# Patient Record
Sex: MALE | Race: WHITE | Employment: OTHER | URBAN - METROPOLITAN AREA
[De-identification: names, ages, dates, MRNs, and addresses within clinical notes are randomized per-mention and may not be internally consistent; named-entity substitution may affect disease eponyms.]

---

## 2017-01-06 ENCOUNTER — OFFICE VISIT (OUTPATIENT)
Dept: UROLOGY | Age: 67
End: 2017-01-06

## 2017-01-06 VITALS
DIASTOLIC BLOOD PRESSURE: 84 MMHG | BODY MASS INDEX: 38.43 KG/M2 | HEIGHT: 73 IN | SYSTOLIC BLOOD PRESSURE: 130 MMHG | WEIGHT: 290 LBS | HEART RATE: 56 BPM

## 2017-01-06 DIAGNOSIS — Z85.46 H/O PROSTATE CANCER: Primary | ICD-10-CM

## 2017-01-06 PROCEDURE — 99213 OFFICE O/P EST LOW 20 MIN: CPT | Performed by: UROLOGY

## 2017-01-06 RX ORDER — UBIDECARENONE 75 MG
50 CAPSULE ORAL DAILY
COMMUNITY

## 2017-01-06 ASSESSMENT — ENCOUNTER SYMPTOMS: SHORTNESS OF BREATH: 0

## 2017-12-28 DIAGNOSIS — Z85.46 H/O PROSTATE CANCER: ICD-10-CM

## 2017-12-28 LAB — PROSTATE SPECIFIC ANTIGEN: 0.03 NG/ML (ref 0–5.4)

## 2018-01-05 ENCOUNTER — OFFICE VISIT (OUTPATIENT)
Dept: UROLOGY | Age: 68
End: 2018-01-05

## 2018-01-05 VITALS
WEIGHT: 289 LBS | HEART RATE: 62 BPM | SYSTOLIC BLOOD PRESSURE: 136 MMHG | BODY MASS INDEX: 38.3 KG/M2 | HEIGHT: 73 IN | DIASTOLIC BLOOD PRESSURE: 80 MMHG

## 2018-01-05 DIAGNOSIS — Z85.46 H/O PROSTATE CANCER: Primary | ICD-10-CM

## 2018-01-05 PROCEDURE — 1123F ACP DISCUSS/DSCN MKR DOCD: CPT | Performed by: UROLOGY

## 2018-01-05 PROCEDURE — G8484 FLU IMMUNIZE NO ADMIN: HCPCS | Performed by: UROLOGY

## 2018-01-05 PROCEDURE — 1036F TOBACCO NON-USER: CPT | Performed by: UROLOGY

## 2018-01-05 PROCEDURE — 99212 OFFICE O/P EST SF 10 MIN: CPT | Performed by: UROLOGY

## 2018-01-05 PROCEDURE — 4040F PNEUMOC VAC/ADMIN/RCVD: CPT | Performed by: UROLOGY

## 2018-01-05 PROCEDURE — 3017F COLORECTAL CA SCREEN DOC REV: CPT | Performed by: UROLOGY

## 2018-01-05 PROCEDURE — G8427 DOCREV CUR MEDS BY ELIG CLIN: HCPCS | Performed by: UROLOGY

## 2018-01-05 PROCEDURE — G8417 CALC BMI ABV UP PARAM F/U: HCPCS | Performed by: UROLOGY

## 2018-01-05 ASSESSMENT — ENCOUNTER SYMPTOMS
SHORTNESS OF BREATH: 0
ABDOMINAL DISTENTION: 0
ABDOMINAL PAIN: 0

## 2018-01-05 NOTE — PROGRESS NOTES
Subjective:      Patient ID: Samuel Mack is a 75 y.o. male. HPI  This is a 74 yo white male with h/o HTN, CAD/Stents and [place] 7 prostate cancer s/p RALP (rt nerve sparing/angeles LND) on 2/14/12. Since last seen on 1/6/17, he has no frequency or urgency and feels the bladder empties. He denies RAYMUNDO. He has no hematuria or dysuria. He has no abd or flank pain and no wt loss or abnl  bone pains. He has no interval medical or surgical problems.  I reviewed his interval PSA today.      PATH: LN: neg, [place] 4+3 = 7 angeles with PN invasion, margins neg, capsule neg, sv neg       Past Medical History:   Diagnosis Date    Erectile dysfunction     Hyperlipidemia     Hypertension     Prostate cancer ([facility] [address] 2011    Prostatectomy Dr. Rocio Chew Type II or unspecified type diabetes mellitus without mention of complication, not stated as uncontrolled     Unspecified sleep apnea     CPAP Machine     Past Surgical History:   Procedure Laterality Date    CARDIAC CATHETERIZATION  2016    COLONOSCOPY  2009 2016    Dr. James Prieto  2001    3 stents place post Heart Attack    EYE SURGERY Bilateral 9/2015 10/2015    cataract   Kevin Alert  2011    Dr. Jessica Espinal. [facility] Given History     Social History    Marital status:      Spouse name: N/A    Number of children: N/A    Years of education: N/A     Social History Main Topics    Smoking status: Former Smoker     Quit date: 2/1/1998    Smokeless tobacco: Never Used    Alcohol use 1.2 oz/week     1 Cans of beer, 1 Glasses of wine per week      Comment: social    Drug use: No    Sexual activity: Not Asked     Other Topics Concern    None     Social History Narrative    None     Family History   Problem Relation Age of Onset    Stroke Mother      Current Outpatient Prescriptions   Medication Sig Dispense Refill    vitamin B-12 (CYANOCOBALAMIN) 100 MCG tablet Take 50 mcg by mouth daily      aspirin 81 MG tablet Take

## 2018-09-18 ENCOUNTER — APPOINTMENT (OUTPATIENT)
Dept: GENERAL RADIOLOGY | Age: 68
End: 2018-09-18
Payer: MEDICARE

## 2018-09-18 ENCOUNTER — HOSPITAL ENCOUNTER (EMERGENCY)
Age: 68
Discharge: HOME | End: 2018-09-18
Payer: MEDICARE

## 2018-09-18 VITALS
OXYGEN SATURATION: 95 % | HEART RATE: 66 BPM | WEIGHT: 280 LBS | HEIGHT: 73 IN | TEMPERATURE: 98.5 F | BODY MASS INDEX: 37.11 KG/M2 | SYSTOLIC BLOOD PRESSURE: 137 MMHG | DIASTOLIC BLOOD PRESSURE: 78 MMHG | RESPIRATION RATE: 18 BRPM

## 2018-09-18 DIAGNOSIS — S52.001A CLOSED FRACTURE OF PROXIMAL END OF RIGHT ULNA, UNSPECIFIED FRACTURE MORPHOLOGY, INITIAL ENCOUNTER: Primary | ICD-10-CM

## 2018-09-18 PROCEDURE — 73080 X-RAY EXAM OF ELBOW: CPT

## 2018-09-18 PROCEDURE — 99283 EMERGENCY DEPT VISIT LOW MDM: CPT

## 2018-09-18 PROCEDURE — 29105 APPLICATION LONG ARM SPLINT: CPT

## 2018-09-18 RX ORDER — OXYCODONE HYDROCHLORIDE AND ACETAMINOPHEN 5; 325 MG/1; MG/1
1 TABLET ORAL EVERY 4 HOURS PRN
Qty: 10 TABLET | Refills: 0 | Status: SHIPPED | OUTPATIENT
Start: 2018-09-18 | End: 2018-09-25

## 2018-09-18 RX ORDER — NAPROXEN 500 MG/1
500 TABLET ORAL 2 TIMES DAILY
Qty: 20 TABLET | Refills: 0 | Status: ON HOLD | OUTPATIENT
Start: 2018-09-18 | End: 2018-11-24 | Stop reason: HOSPADM

## 2018-09-18 ASSESSMENT — ENCOUNTER SYMPTOMS
ABDOMINAL PAIN: 0
COUGH: 0
BACK PAIN: 0
SHORTNESS OF BREATH: 0

## 2018-09-18 ASSESSMENT — PAIN DESCRIPTION - LOCATION: LOCATION: ELBOW

## 2018-09-18 ASSESSMENT — PAIN SCALES - GENERAL: PAINLEVEL_OUTOF10: 6

## 2018-09-18 ASSESSMENT — PAIN DESCRIPTION - PAIN TYPE: TYPE: ACUTE PAIN

## 2018-09-18 ASSESSMENT — PAIN DESCRIPTION - ORIENTATION: ORIENTATION: RIGHT
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
## 2018-11-20 ENCOUNTER — APPOINTMENT (OUTPATIENT)
Dept: GENERAL RADIOLOGY | Age: 68
DRG: 282 | End: 2018-11-20
Payer: MEDICARE

## 2018-11-20 ENCOUNTER — HOSPITAL ENCOUNTER (INPATIENT)
Age: 68
LOS: 4 days | Discharge: HOME | DRG: 282 | End: 2018-11-24
Attending: EMERGENCY MEDICINE | Admitting: INTERNAL MEDICINE
Payer: MEDICARE

## 2018-11-20 ENCOUNTER — APPOINTMENT (OUTPATIENT)
Dept: CT IMAGING | Age: 68
DRG: 282 | End: 2018-11-20
Payer: MEDICARE

## 2018-11-20 DIAGNOSIS — I48.91 ATRIAL FIBRILLATION WITH RVR (HCC): Primary | ICD-10-CM

## 2018-11-20 LAB
ALBUMIN SERPL-MCNC: 4.5 G/DL (ref 3.9–4.9)
ALP BLD-CCNC: 86 U/L (ref 35–104)
ALT SERPL-CCNC: 34 U/L (ref 0–41)
ANION GAP SERPL CALCULATED.3IONS-SCNC: 12 MEQ/L (ref 7–13)
APTT: 26.6 SEC (ref 21.6–35.4)
AST SERPL-CCNC: 24 U/L (ref 0–40)
BASOPHILS ABSOLUTE: 0 K/UL (ref 0–0.2)
BASOPHILS RELATIVE PERCENT: 0.4 %
BILIRUB SERPL-MCNC: 0.5 MG/DL (ref 0–1.2)
BUN BLDV-MCNC: 20 MG/DL (ref 8–23)
CALCIUM SERPL-MCNC: 10.2 MG/DL (ref 8.6–10.2)
CHLORIDE BLD-SCNC: 101 MEQ/L (ref 98–107)
CO2: 28 MEQ/L (ref 22–29)
CREAT SERPL-MCNC: 1.09 MG/DL (ref 0.7–1.2)
D DIMER: 0.27 MG/L FEU (ref 0–0.5)
EOSINOPHILS ABSOLUTE: 0.1 K/UL (ref 0–0.7)
EOSINOPHILS RELATIVE PERCENT: 1.5 %
GFR AFRICAN AMERICAN: >60
GFR NON-AFRICAN AMERICAN: >60
GLOBULIN: 2.6 G/DL (ref 2.3–3.5)
GLUCOSE BLD-MCNC: 118 MG/DL (ref 60–115)
GLUCOSE BLD-MCNC: 129 MG/DL (ref 60–115)
GLUCOSE BLD-MCNC: 175 MG/DL (ref 74–109)
HCT VFR BLD CALC: 46 % (ref 42–52)
HEMOGLOBIN: 16 G/DL (ref 14–18)
INR BLD: 1
LYMPHOCYTES ABSOLUTE: 1.1 K/UL (ref 1–4.8)
LYMPHOCYTES RELATIVE PERCENT: 14.4 %
MAGNESIUM: 2 MG/DL (ref 1.7–2.3)
MCH RBC QN AUTO: 33.2 PG (ref 27–31.3)
MCHC RBC AUTO-ENTMCNC: 34.8 % (ref 33–37)
MCV RBC AUTO: 95.6 FL (ref 80–100)
MONOCYTES ABSOLUTE: 0.7 K/UL (ref 0.2–0.8)
MONOCYTES RELATIVE PERCENT: 8.7 %
NEUTROPHILS ABSOLUTE: 5.8 K/UL (ref 1.4–6.5)
NEUTROPHILS RELATIVE PERCENT: 75 %
PDW BLD-RTO: 13.3 % (ref 11.5–14.5)
PERFORMED ON: ABNORMAL
PERFORMED ON: ABNORMAL
PLATELET # BLD: 160 K/UL (ref 130–400)
POTASSIUM SERPL-SCNC: 4.7 MEQ/L (ref 3.5–5.1)
PRO-BNP: 159 PG/ML
PROTHROMBIN TIME: 10.8 SEC (ref 9.6–12.3)
RBC # BLD: 4.81 M/UL (ref 4.7–6.1)
SODIUM BLD-SCNC: 141 MEQ/L (ref 132–144)
TOTAL PROTEIN: 7.1 G/DL (ref 6.4–8.1)
TROPONIN: 0.12 NG/ML (ref 0–0.01)
TROPONIN: 0.47 NG/ML (ref 0–0.01)
TROPONIN: <0.01 NG/ML (ref 0–0.01)
WBC # BLD: 7.7 K/UL (ref 4.8–10.8)

## 2018-11-20 PROCEDURE — 85610 PROTHROMBIN TIME: CPT

## 2018-11-20 PROCEDURE — 84484 ASSAY OF TROPONIN QUANT: CPT

## 2018-11-20 PROCEDURE — 96374 THER/PROPH/DIAG INJ IV PUSH: CPT

## 2018-11-20 PROCEDURE — 2580000003 HC RX 258: Performed by: EMERGENCY MEDICINE

## 2018-11-20 PROCEDURE — 93005 ELECTROCARDIOGRAM TRACING: CPT

## 2018-11-20 PROCEDURE — 93010 ELECTROCARDIOGRAM REPORT: CPT | Performed by: INTERNAL MEDICINE

## 2018-11-20 PROCEDURE — 80053 COMPREHEN METABOLIC PANEL: CPT

## 2018-11-20 PROCEDURE — 83880 ASSAY OF NATRIURETIC PEPTIDE: CPT

## 2018-11-20 PROCEDURE — 96375 TX/PRO/DX INJ NEW DRUG ADDON: CPT

## 2018-11-20 PROCEDURE — 6360000004 HC RX CONTRAST MEDICATION: Performed by: EMERGENCY MEDICINE

## 2018-11-20 PROCEDURE — 85379 FIBRIN DEGRADATION QUANT: CPT

## 2018-11-20 PROCEDURE — 85730 THROMBOPLASTIN TIME PARTIAL: CPT

## 2018-11-20 PROCEDURE — 71046 X-RAY EXAM CHEST 2 VIEWS: CPT

## 2018-11-20 PROCEDURE — 99285 EMERGENCY DEPT VISIT HI MDM: CPT

## 2018-11-20 PROCEDURE — 2060000000 HC ICU INTERMEDIATE R&B

## 2018-11-20 PROCEDURE — 85025 COMPLETE CBC W/AUTO DIFF WBC: CPT

## 2018-11-20 PROCEDURE — 36415 COLL VENOUS BLD VENIPUNCTURE: CPT

## 2018-11-20 PROCEDURE — 96376 TX/PRO/DX INJ SAME DRUG ADON: CPT

## 2018-11-20 PROCEDURE — 6370000000 HC RX 637 (ALT 250 FOR IP): Performed by: INTERNAL MEDICINE

## 2018-11-20 PROCEDURE — 83735 ASSAY OF MAGNESIUM: CPT

## 2018-11-20 PROCEDURE — 6360000002 HC RX W HCPCS: Performed by: EMERGENCY MEDICINE

## 2018-11-20 PROCEDURE — 2580000003 HC RX 258: Performed by: INTERNAL MEDICINE

## 2018-11-20 PROCEDURE — 74177 CT ABD & PELVIS W/CONTRAST: CPT

## 2018-11-20 PROCEDURE — 2500000003 HC RX 250 WO HCPCS: Performed by: EMERGENCY MEDICINE

## 2018-11-20 RX ORDER — MORPHINE SULFATE 4 MG/ML
4 INJECTION, SOLUTION INTRAMUSCULAR; INTRAVENOUS EVERY 4 HOURS PRN
Status: DISCONTINUED | OUTPATIENT
Start: 2018-11-20 | End: 2018-11-24 | Stop reason: HOSPADM

## 2018-11-20 RX ORDER — METOPROLOL SUCCINATE 50 MG/1
50 TABLET, EXTENDED RELEASE ORAL DAILY
Status: DISCONTINUED | OUTPATIENT
Start: 2018-11-20 | End: 2018-11-20

## 2018-11-20 RX ORDER — UBIDECARENONE 75 MG
50 CAPSULE ORAL DAILY
Status: DISCONTINUED | OUTPATIENT
Start: 2018-11-20 | End: 2018-11-24 | Stop reason: HOSPADM

## 2018-11-20 RX ORDER — METOPROLOL TARTRATE 5 MG/5ML
5 INJECTION INTRAVENOUS ONCE
Status: COMPLETED | OUTPATIENT
Start: 2018-11-20 | End: 2018-11-20

## 2018-11-20 RX ORDER — ACETAMINOPHEN 325 MG/1
650 TABLET ORAL EVERY 4 HOURS PRN
Status: DISCONTINUED | OUTPATIENT
Start: 2018-11-20 | End: 2018-11-24 | Stop reason: HOSPADM

## 2018-11-20 RX ORDER — MORPHINE SULFATE 4 MG/ML
4 INJECTION, SOLUTION INTRAMUSCULAR; INTRAVENOUS
Status: DISCONTINUED | OUTPATIENT
Start: 2018-11-20 | End: 2018-11-20

## 2018-11-20 RX ORDER — DIGOXIN 0.25 MG/ML
500 INJECTION INTRAMUSCULAR; INTRAVENOUS ONCE
Status: COMPLETED | OUTPATIENT
Start: 2018-11-20 | End: 2018-11-20

## 2018-11-20 RX ORDER — 0.9 % SODIUM CHLORIDE 0.9 %
1000 INTRAVENOUS SOLUTION INTRAVENOUS ONCE
Status: COMPLETED | OUTPATIENT
Start: 2018-11-20 | End: 2018-11-20

## 2018-11-20 RX ORDER — ONDANSETRON 2 MG/ML
4 INJECTION INTRAMUSCULAR; INTRAVENOUS EVERY 6 HOURS PRN
Status: DISCONTINUED | OUTPATIENT
Start: 2018-11-20 | End: 2018-11-22

## 2018-11-20 RX ORDER — RAMIPRIL 5 MG/1
10 CAPSULE ORAL DAILY
Status: DISCONTINUED | OUTPATIENT
Start: 2018-11-20 | End: 2018-11-24 | Stop reason: HOSPADM

## 2018-11-20 RX ORDER — NAPROXEN 500 MG/1
500 TABLET ORAL 2 TIMES DAILY
Status: DISCONTINUED | OUTPATIENT
Start: 2018-11-20 | End: 2018-11-20

## 2018-11-20 RX ORDER — NITROGLYCERIN 0.4 MG/1
0.4 TABLET SUBLINGUAL EVERY 5 MIN PRN
Status: DISCONTINUED | OUTPATIENT
Start: 2018-11-20 | End: 2018-11-24 | Stop reason: HOSPADM

## 2018-11-20 RX ORDER — METOPROLOL SUCCINATE 25 MG/1
25 TABLET, EXTENDED RELEASE ORAL 2 TIMES DAILY
Status: DISCONTINUED | OUTPATIENT
Start: 2018-11-20 | End: 2018-11-22

## 2018-11-20 RX ORDER — ASPIRIN 81 MG/1
81 TABLET ORAL DAILY
Status: DISCONTINUED | OUTPATIENT
Start: 2018-11-20 | End: 2018-11-24 | Stop reason: HOSPADM

## 2018-11-20 RX ORDER — DILTIAZEM HYDROCHLORIDE 5 MG/ML
10 INJECTION INTRAVENOUS ONCE
Status: COMPLETED | OUTPATIENT
Start: 2018-11-20 | End: 2018-11-20

## 2018-11-20 RX ORDER — SODIUM CHLORIDE 0.9 % (FLUSH) 0.9 %
10 SYRINGE (ML) INJECTION PRN
Status: DISCONTINUED | OUTPATIENT
Start: 2018-11-20 | End: 2018-11-24 | Stop reason: HOSPADM

## 2018-11-20 RX ORDER — SODIUM CHLORIDE 0.9 % (FLUSH) 0.9 %
10 SYRINGE (ML) INJECTION EVERY 12 HOURS SCHEDULED
Status: DISCONTINUED | OUTPATIENT
Start: 2018-11-20 | End: 2018-11-24

## 2018-11-20 RX ORDER — ONDANSETRON 2 MG/ML
4 INJECTION INTRAMUSCULAR; INTRAVENOUS ONCE
Status: COMPLETED | OUTPATIENT
Start: 2018-11-20 | End: 2018-11-20

## 2018-11-20 RX ORDER — ASPIRIN 81 MG/1
81 TABLET, CHEWABLE ORAL 2 TIMES DAILY
Status: DISCONTINUED | OUTPATIENT
Start: 2018-11-20 | End: 2018-11-20

## 2018-11-20 RX ORDER — LEVOTHYROXINE SODIUM 0.05 MG/1
50 TABLET ORAL DAILY
Status: DISCONTINUED | OUTPATIENT
Start: 2018-11-20 | End: 2018-11-24 | Stop reason: HOSPADM

## 2018-11-20 RX ORDER — ROSUVASTATIN CALCIUM 10 MG/1
10 TABLET, COATED ORAL DAILY
Status: DISCONTINUED | OUTPATIENT
Start: 2018-11-20 | End: 2018-11-24 | Stop reason: HOSPADM

## 2018-11-20 RX ADMIN — Medication 10 ML: at 20:13

## 2018-11-20 RX ADMIN — ENOXAPARIN SODIUM 120 MG: 120 INJECTION SUBCUTANEOUS at 13:20

## 2018-11-20 RX ADMIN — MORPHINE SULFATE 4 MG: 4 INJECTION, SOLUTION INTRAMUSCULAR; INTRAVENOUS at 11:48

## 2018-11-20 RX ADMIN — DILTIAZEM HYDROCHLORIDE 5 MG/HR: 5 INJECTION INTRAVENOUS at 13:28

## 2018-11-20 RX ADMIN — METOPROLOL TARTRATE 5 MG: 1 INJECTION, SOLUTION INTRAVENOUS at 12:07

## 2018-11-20 RX ADMIN — METOPROLOL TARTRATE 5 MG: 1 INJECTION, SOLUTION INTRAVENOUS at 11:58

## 2018-11-20 RX ADMIN — METOPROLOL SUCCINATE 25 MG: 25 TABLET, EXTENDED RELEASE ORAL at 20:08

## 2018-11-20 RX ADMIN — ONDANSETRON 4 MG: 2 INJECTION INTRAMUSCULAR; INTRAVENOUS at 11:49

## 2018-11-20 RX ADMIN — METOPROLOL TARTRATE 5 MG: 1 INJECTION, SOLUTION INTRAVENOUS at 12:26

## 2018-11-20 RX ADMIN — IOPAMIDOL 100 ML: 755 INJECTION, SOLUTION INTRAVENOUS at 14:39

## 2018-11-20 RX ADMIN — METOPROLOL TARTRATE 5 MG: 1 INJECTION, SOLUTION INTRAVENOUS at 11:48

## 2018-11-20 RX ADMIN — HYDROMORPHONE HYDROCHLORIDE 1 MG: 1 INJECTION, SOLUTION INTRAMUSCULAR; INTRAVENOUS; SUBCUTANEOUS at 12:26

## 2018-11-20 RX ADMIN — SODIUM CHLORIDE 1000 ML: 9 INJECTION, SOLUTION INTRAVENOUS at 11:48

## 2018-11-20 RX ADMIN — DIGOXIN 500 MCG: 250 INJECTION, SOLUTION INTRAMUSCULAR; INTRAVENOUS; PARENTERAL at 13:18

## 2018-11-20 RX ADMIN — ONDANSETRON 4 MG: 2 INJECTION INTRAMUSCULAR; INTRAVENOUS at 12:26

## 2018-11-20 RX ADMIN — DILTIAZEM HYDROCHLORIDE 10 MG: 5 INJECTION INTRAVENOUS at 13:25

## 2018-11-20 RX ADMIN — ROSUVASTATIN 10 MG: 10 TABLET, FILM COATED ORAL at 20:08

## 2018-11-20 ASSESSMENT — PAIN DESCRIPTION - LOCATION: LOCATION: CHEST

## 2018-11-20 ASSESSMENT — PAIN SCALES - GENERAL
PAINLEVEL_OUTOF10: 0
PAINLEVEL_OUTOF10: 8
PAINLEVEL_OUTOF10: 0

## 2018-11-20 ASSESSMENT — ENCOUNTER SYMPTOMS
SORE THROAT: 0
VOMITING: 0
NAUSEA: 0
DIARRHEA: 0
SHORTNESS OF BREATH: 0
COUGH: 0
ABDOMINAL PAIN: 0
BACK PAIN: 0

## 2018-11-20 ASSESSMENT — PAIN DESCRIPTION - PAIN TYPE: TYPE: ACUTE PAIN

## 2018-11-20 ASSESSMENT — PAIN DESCRIPTION - DESCRIPTORS: DESCRIPTORS: SHARP

## 2018-11-20 NOTE — ED PROVIDER NOTES
cardizem bolus, and gtt, subQ lovenox and admission for cards. Pt educated about the results and reassessed third time and feels well. Pt given IV digoxin, IV cardizem bolus and gtt and admitted to cardiology in serious condition. Pt understands plan. CRITICAL CARE TIME   Total CriticalCare time was 49 minutes, excluding separately reportable procedures. There was a high probability of clinically significant/life threatening deterioration in the patient's condition which required my urgent intervention. PROCEDURES:  Unlessotherwise noted below, none     Procedures      FINAL IMPRESSION      1.  Atrial fibrillation with RVR [facility])          DISPOSITION/PLAN   DISPOSITION Decision To Admit 11/20/2018 12:34:16 PM          Carey Langley MD (electronically signed)  Attending Emergency Physician          Carey Langley MD  11/20/18 1270

## 2018-11-20 NOTE — H&P
[address] [facility] Cardiology Admit Note        Date:    2018    Patient:   Jarrod Villegas    :   1950  CSN:   [id number]    Admitting Cardiologist: [address] [place] [address]     Primary Cardiologist: Geovani Lau M.D.    Reason for Admission:  Chest pain, new onset atrial fibrillation with rapid ventricular rate      Assessment:    1. Chest pain, new onset  2. Atrial fibrillation with rapid ventricular rate, new onset, chemically converted to sinus rhythm  3. Bradycardia, possible sick sinus syndrome ( Holter 2018 with arun BLOCK, PVCs). 4. Coronary artery disease, prior history of LAD and left circumflex stent angioplasties , repeat cardiac catheterization 2016 with patent LAD stent, left circumflex ostial occlusion, right coronary 50% stenosis, medical treated. 5. Prior inferolateral myocardial infarction  6. Ischemic cardiomyopathy, ejection fraction 45%  7. Hypertension  8. Hyperlipidemia  9. Diabetes  10. Erectile dysfunction  11. Morbid obesity  12. Prostate cancer post radical prostatectomy   13. Past smoker  14. Family history of coronary artery disease    Plan:    1. Cardiovascular supportive care  2. Telemetry monitoring  3. Serial cardiac enzymes and laboratory studies to rule out acute coronary syndrome, other. 4. D-dimer and TSH. 5. Echocardiogram  6. Lexiscan Myoview perfusion stress test  7. Possible cardiac catheterization as warranted ( +/- PCI ). 8. Wean off IV Cardizem drip as tolerated. 9. Lovenox for now with Eliquis upon discharge. 10. Continue metoprolol as tolerated  11. No further digoxin at this point. 12. Consider amiodarone or sotalol and/or if recurrence. 13. CPAP therapy. 14. Further recommendations to follow. 15. Possible discharge home tomorrow if stable.   16. See orders    HISTORY OF PRESENT ILLNESS:      The patient is a 70 y.o. male who follows with Dr. Livia Llanes for known history of coronary artery disease post stenting, cardiomyopathy, hypertension, Protime 10.8 9.6 - 12.3 sec    INR 1.0    EKG 12 Lead    Collection Time: 11/20/18  1:59 PM   Result Value Ref Range    Ventricular Rate 47 BPM    Atrial Rate 47 BPM    P-R Interval 184 ms    QRS Duration 88 ms    Q-T Interval 448 ms    QTc Calculation (Bazett) 396 ms    P Axis 41 degrees    R Axis -9 degrees    T Axis 109 degrees       ECG:    AFib with RVR,   Now repeat with SR, rate 47.     TELEMETRY:  Afib with RVR 190s, Now SR 50s        Joellen Castro MD   [facility] Cardiologist      3:41 PM

## 2018-11-21 ENCOUNTER — APPOINTMENT (OUTPATIENT)
Dept: NUCLEAR MEDICINE | Age: 68
DRG: 282 | End: 2018-11-21
Payer: MEDICARE

## 2018-11-21 ENCOUNTER — APPOINTMENT (OUTPATIENT)
Dept: CARDIAC CATH/INVASIVE PROCEDURES | Age: 68
DRG: 282 | End: 2018-11-21
Payer: MEDICARE

## 2018-11-21 ENCOUNTER — APPOINTMENT (OUTPATIENT)
Dept: GENERAL RADIOLOGY | Age: 68
DRG: 282 | End: 2018-11-21
Payer: MEDICARE

## 2018-11-21 LAB
ALBUMIN SERPL-MCNC: 3.8 G/DL (ref 3.9–4.9)
ALP BLD-CCNC: 72 U/L (ref 35–104)
ALT SERPL-CCNC: 31 U/L (ref 0–41)
ANION GAP SERPL CALCULATED.3IONS-SCNC: 10 MEQ/L (ref 7–13)
AST SERPL-CCNC: 36 U/L (ref 0–40)
BILIRUB SERPL-MCNC: 0.5 MG/DL (ref 0–1.2)
BUN BLDV-MCNC: 16 MG/DL (ref 8–23)
C-REACTIVE PROTEIN, HIGH SENSITIVITY: 1 MG/L (ref 0–5)
CALCIUM SERPL-MCNC: 9.6 MG/DL (ref 8.6–10.2)
CHLORIDE BLD-SCNC: 103 MEQ/L (ref 98–107)
CO2: 25 MEQ/L (ref 22–29)
CREAT SERPL-MCNC: 0.94 MG/DL (ref 0.7–1.2)
DIGOXIN LEVEL: 0.5 NG/ML (ref 0.8–2)
GFR AFRICAN AMERICAN: >60
GFR NON-AFRICAN AMERICAN: >60
GLOBULIN: 2.2 G/DL (ref 2.3–3.5)
GLUCOSE BLD-MCNC: 109 MG/DL (ref 60–115)
GLUCOSE BLD-MCNC: 119 MG/DL (ref 74–109)
GLUCOSE BLD-MCNC: 187 MG/DL (ref 60–115)
HCT VFR BLD CALC: 42.2 % (ref 42–52)
HEMOGLOBIN: 14.4 G/DL (ref 14–18)
LV EF: 51 %
LVEF MODALITY: NORMAL
MAGNESIUM: 2.1 MG/DL (ref 1.7–2.3)
MCH RBC QN AUTO: 33.1 PG (ref 27–31.3)
MCHC RBC AUTO-ENTMCNC: 34.2 % (ref 33–37)
MCV RBC AUTO: 96.7 FL (ref 80–100)
PDW BLD-RTO: 13.2 % (ref 11.5–14.5)
PERFORMED ON: ABNORMAL
PERFORMED ON: NORMAL
PLATELET # BLD: 145 K/UL (ref 130–400)
POTASSIUM SERPL-SCNC: 4.9 MEQ/L (ref 3.5–5.1)
RBC # BLD: 4.36 M/UL (ref 4.7–6.1)
SEDIMENTATION RATE, ERYTHROCYTE: 1 MM (ref 0–20)
SODIUM BLD-SCNC: 138 MEQ/L (ref 132–144)
TOTAL PROTEIN: 6 G/DL (ref 6.4–8.1)
TROPONIN: 0.29 NG/ML (ref 0–0.01)
TROPONIN: 0.33 NG/ML (ref 0–0.01)
TROPONIN: 0.57 NG/ML (ref 0–0.01)
TROPONIN: 0.6 NG/ML (ref 0–0.01)
TSH SERPL DL<=0.05 MIU/L-ACNC: 2.2 UIU/ML (ref 0.27–4.2)
WBC # BLD: 5.8 K/UL (ref 4.8–10.8)

## 2018-11-21 PROCEDURE — 80053 COMPREHEN METABOLIC PANEL: CPT

## 2018-11-21 PROCEDURE — 2500000003 HC RX 250 WO HCPCS

## 2018-11-21 PROCEDURE — 84443 ASSAY THYROID STIM HORMONE: CPT

## 2018-11-21 PROCEDURE — 93458 L HRT ARTERY/VENTRICLE ANGIO: CPT | Performed by: INTERNAL MEDICINE

## 2018-11-21 PROCEDURE — 4A033BC MEASUREMENT OF ARTERIAL PRESSURE, CORONARY, PERCUTANEOUS APPROACH: ICD-10-PCS | Performed by: INTERNAL MEDICINE

## 2018-11-21 PROCEDURE — 6360000002 HC RX W HCPCS

## 2018-11-21 PROCEDURE — 2580000003 HC RX 258

## 2018-11-21 PROCEDURE — 80162 ASSAY OF DIGOXIN TOTAL: CPT

## 2018-11-21 PROCEDURE — 78452 HT MUSCLE IMAGE SPECT MULT: CPT

## 2018-11-21 PROCEDURE — 71046 X-RAY EXAM CHEST 2 VIEWS: CPT

## 2018-11-21 PROCEDURE — A9502 TC99M TETROFOSMIN: HCPCS | Performed by: INTERNAL MEDICINE

## 2018-11-21 PROCEDURE — 93306 TTE W/DOPPLER COMPLETE: CPT

## 2018-11-21 PROCEDURE — 93571 IV DOP VEL&/PRESS C FLO 1ST: CPT | Performed by: INTERNAL MEDICINE

## 2018-11-21 PROCEDURE — C1769 GUIDE WIRE: HCPCS

## 2018-11-21 PROCEDURE — 2060000000 HC ICU INTERMEDIATE R&B

## 2018-11-21 PROCEDURE — 4A023N7 MEASUREMENT OF CARDIAC SAMPLING AND PRESSURE, LEFT HEART, PERCUTANEOUS APPROACH: ICD-10-PCS | Performed by: INTERNAL MEDICINE

## 2018-11-21 PROCEDURE — B211YZZ FLUOROSCOPY OF MULTIPLE CORONARY ARTERIES USING OTHER CONTRAST: ICD-10-PCS | Performed by: INTERNAL MEDICINE

## 2018-11-21 PROCEDURE — 85027 COMPLETE CBC AUTOMATED: CPT

## 2018-11-21 PROCEDURE — 6360000002 HC RX W HCPCS: Performed by: INTERNAL MEDICINE

## 2018-11-21 PROCEDURE — C1887 CATHETER, GUIDING: HCPCS

## 2018-11-21 PROCEDURE — 3430000000 HC RX DIAGNOSTIC RADIOPHARMACEUTICAL: Performed by: INTERNAL MEDICINE

## 2018-11-21 PROCEDURE — C1894 INTRO/SHEATH, NON-LASER: HCPCS

## 2018-11-21 PROCEDURE — 36415 COLL VENOUS BLD VENIPUNCTURE: CPT

## 2018-11-21 PROCEDURE — 2580000003 HC RX 258: Performed by: INTERNAL MEDICINE

## 2018-11-21 PROCEDURE — 93017 CV STRESS TEST TRACING ONLY: CPT

## 2018-11-21 PROCEDURE — 83735 ASSAY OF MAGNESIUM: CPT

## 2018-11-21 PROCEDURE — 2709999900 HC NON-CHARGEABLE SUPPLY

## 2018-11-21 PROCEDURE — 84484 ASSAY OF TROPONIN QUANT: CPT

## 2018-11-21 PROCEDURE — C1760 CLOSURE DEV, VASC: HCPCS

## 2018-11-21 PROCEDURE — B215YZZ FLUOROSCOPY OF LEFT HEART USING OTHER CONTRAST: ICD-10-PCS | Performed by: INTERNAL MEDICINE

## 2018-11-21 PROCEDURE — 93005 ELECTROCARDIOGRAM TRACING: CPT

## 2018-11-21 PROCEDURE — 86141 C-REACTIVE PROTEIN HS: CPT

## 2018-11-21 PROCEDURE — 6370000000 HC RX 637 (ALT 250 FOR IP): Performed by: INTERNAL MEDICINE

## 2018-11-21 PROCEDURE — 85652 RBC SED RATE AUTOMATED: CPT

## 2018-11-21 RX ORDER — SODIUM CHLORIDE 9 MG/ML
INJECTION, SOLUTION INTRAVENOUS
Status: COMPLETED
Start: 2018-11-21 | End: 2018-11-21

## 2018-11-21 RX ORDER — ALPRAZOLAM 0.5 MG/1
0.5 TABLET ORAL
Status: ACTIVE | OUTPATIENT
Start: 2018-11-21 | End: 2018-11-21

## 2018-11-21 RX ORDER — SODIUM CHLORIDE 0.9 % (FLUSH) 0.9 %
10 SYRINGE (ML) INJECTION PRN
Status: DISCONTINUED | OUTPATIENT
Start: 2018-11-21 | End: 2018-11-24 | Stop reason: HOSPADM

## 2018-11-21 RX ORDER — SODIUM CHLORIDE 9 MG/ML
INJECTION, SOLUTION INTRAVENOUS CONTINUOUS
Status: DISCONTINUED | OUTPATIENT
Start: 2018-11-21 | End: 2018-11-22

## 2018-11-21 RX ADMIN — Medication 10 ML: at 09:51

## 2018-11-21 RX ADMIN — ASPIRIN 81 MG: 81 TABLET, COATED ORAL at 14:44

## 2018-11-21 RX ADMIN — TETROFOSMIN 30.2 MILLICURIE: 0.23 INJECTION, POWDER, LYOPHILIZED, FOR SOLUTION INTRAVENOUS at 09:51

## 2018-11-21 RX ADMIN — REGADENOSON 0.4 MG: 0.08 INJECTION, SOLUTION INTRAVENOUS at 09:57

## 2018-11-21 RX ADMIN — METOPROLOL SUCCINATE 25 MG: 25 TABLET, EXTENDED RELEASE ORAL at 14:45

## 2018-11-21 RX ADMIN — TETROFOSMIN 11.3 MILLICURIE: 0.23 INJECTION, POWDER, LYOPHILIZED, FOR SOLUTION INTRAVENOUS at 08:30

## 2018-11-21 RX ADMIN — Medication 10 ML: at 09:52

## 2018-11-21 RX ADMIN — SODIUM CHLORIDE: 9 INJECTION, SOLUTION INTRAVENOUS at 20:02

## 2018-11-21 RX ADMIN — METOPROLOL SUCCINATE 25 MG: 25 TABLET, EXTENDED RELEASE ORAL at 20:02

## 2018-11-21 RX ADMIN — Medication 10 ML: at 20:02

## 2018-11-21 RX ADMIN — SODIUM CHLORIDE 1000 ML: 9 INJECTION, SOLUTION INTRAVENOUS at 15:09

## 2018-11-21 RX ADMIN — ROSUVASTATIN 10 MG: 10 TABLET, FILM COATED ORAL at 20:02

## 2018-11-21 RX ADMIN — SODIUM CHLORIDE: 9 INJECTION, SOLUTION INTRAVENOUS at 17:27

## 2018-11-21 RX ADMIN — LEVOTHYROXINE SODIUM 50 MCG: 50 TABLET ORAL at 05:35

## 2018-11-21 ASSESSMENT — PAIN SCALES - GENERAL
PAINLEVEL_OUTOF10: 0

## 2018-11-21 NOTE — PROCEDURES
[address]                      [address], [address] [facility]                              CARDIAC STRESS TEST    PATIENT NAME: Ruthine Meigs                    :        1950  MED REC NO:   [id number]                            ROOM:       [facility]  ACCOUNT NO:   [de-identified]                           ADMIT DATE: 2018  PROVIDER:     Micheal Staley MD    CARDIOVASCULAR DIAGNOSTIC DEPARTMENT    DATE OF STUDY:  2018    RESTING ECG:  Sinus bradycardia, otherwise normal record. LEXISCAN:  Lexiscan infused to a total dose of 0.4 mg in a rapid bolus  fashion followed by 30.2 mCi of Myoview. Resting imaging performed  earlier in the day after the injection of 11.3 mCi of Myoview. The  patient tolerated the procedure well without effect. ECG AND HEMODYNAMICS:  Chronotropic and blood pressure responses are  normal.  ECG monitoring reveals no significant ST-T abnormalities and  dysrhythmias. MYOVIEW STUDY:  Myoview injected as described above. Overall ejection  fraction is calculated at 54% without LV dilatation, RV dilatation,  transient ischemic dilatation, or segmental wall motion abnormalities. Perfusion shows moderately severe fairly extensive lateral ischemia in  circumflex distribution. No LV dilatation is seen. FINAL IMPRESSION:  Abnormal Lexiscan with moderate risk for cardiac  events given extensive lateral ischemia of moderate severity. Well over  10% of the myocardium is involved. Clinical correlation is advised.    The LV systolic function is normal.        Rachid Aguilar MD    D: 2018 #14:34:49       T: 2018 14:47:22     GOLD/[email or web address]  Job#: [id number]     Doc#: [id number]    CC:

## 2018-11-21 NOTE — FLOWSHEET NOTE
At approx 0200, lab called with critical top of 0.603 (up from 0.474). RN into room. Pt denies CP/SOB. Reports he feels \"fine. \" Vitals stable. RN spoke with Dr. Otis Mercado. No new orders at this time.  Electronically signed by Digna Lee RN on 11/21/2018 at 2:07 AM

## 2018-11-22 PROBLEM — I49.5 SICK SINUS SYNDROME (HCC): Status: ACTIVE | Noted: 2018-11-22

## 2018-11-22 PROBLEM — I25.10 CORONARY ARTERY DISEASE INVOLVING NATIVE CORONARY ARTERY: Status: ACTIVE | Noted: 2018-11-22

## 2018-11-22 PROBLEM — I10 ESSENTIAL HYPERTENSION: Status: ACTIVE | Noted: 2018-11-22

## 2018-11-22 LAB
GLUCOSE BLD-MCNC: 101 MG/DL (ref 60–115)
GLUCOSE BLD-MCNC: 107 MG/DL (ref 60–115)
GLUCOSE BLD-MCNC: 83 MG/DL (ref 60–115)
GLUCOSE BLD-MCNC: 92 MG/DL (ref 60–115)
PERFORMED ON: NORMAL
TROPONIN: 0.29 NG/ML (ref 0–0.01)
TROPONIN: 0.3 NG/ML (ref 0–0.01)
TSH SERPL DL<=0.05 MIU/L-ACNC: 2.08 UIU/ML (ref 0.27–4.2)

## 2018-11-22 PROCEDURE — 93005 ELECTROCARDIOGRAM TRACING: CPT

## 2018-11-22 PROCEDURE — 2580000003 HC RX 258: Performed by: INTERNAL MEDICINE

## 2018-11-22 PROCEDURE — 6370000000 HC RX 637 (ALT 250 FOR IP): Performed by: INTERNAL MEDICINE

## 2018-11-22 PROCEDURE — 36415 COLL VENOUS BLD VENIPUNCTURE: CPT

## 2018-11-22 PROCEDURE — 84484 ASSAY OF TROPONIN QUANT: CPT

## 2018-11-22 PROCEDURE — 84443 ASSAY THYROID STIM HORMONE: CPT

## 2018-11-22 PROCEDURE — 2060000000 HC ICU INTERMEDIATE R&B

## 2018-11-22 RX ORDER — DOFETILIDE 0.5 MG/1
500 CAPSULE ORAL EVERY 12 HOURS SCHEDULED
Status: DISCONTINUED | OUTPATIENT
Start: 2018-11-22 | End: 2018-11-24 | Stop reason: HOSPADM

## 2018-11-22 RX ORDER — METOPROLOL SUCCINATE 50 MG/1
50 TABLET, EXTENDED RELEASE ORAL DAILY
Status: DISCONTINUED | OUTPATIENT
Start: 2018-11-22 | End: 2018-11-23

## 2018-11-22 RX ADMIN — APIXABAN 5 MG: 5 TABLET, FILM COATED ORAL at 21:16

## 2018-11-22 RX ADMIN — VITAMIN D, TAB 1000IU (100/BT) 2000 UNITS: 25 TAB at 07:55

## 2018-11-22 RX ADMIN — METOPROLOL SUCCINATE 25 MG: 25 TABLET, EXTENDED RELEASE ORAL at 07:56

## 2018-11-22 RX ADMIN — DOFETILIDE 500 MCG: 0.5 CAPSULE ORAL at 21:16

## 2018-11-22 RX ADMIN — METOPROLOL SUCCINATE 25 MG: 50 TABLET, EXTENDED RELEASE ORAL at 09:15

## 2018-11-22 RX ADMIN — Medication 10 ML: at 21:16

## 2018-11-22 RX ADMIN — DOFETILIDE 500 MCG: 0.5 CAPSULE ORAL at 09:13

## 2018-11-22 RX ADMIN — VITAMIN B12 0.1 MG ORAL TABLET 50 MCG: 0.1 TABLET ORAL at 07:56

## 2018-11-22 RX ADMIN — LEVOTHYROXINE SODIUM 50 MCG: 50 TABLET ORAL at 05:45

## 2018-11-22 RX ADMIN — RAMIPRIL 10 MG: 5 CAPSULE ORAL at 07:55

## 2018-11-22 RX ADMIN — ASPIRIN 81 MG: 81 TABLET, COATED ORAL at 07:55

## 2018-11-22 RX ADMIN — Medication 10 ML: at 07:56

## 2018-11-22 RX ADMIN — ROSUVASTATIN 10 MG: 10 TABLET, FILM COATED ORAL at 07:56

## 2018-11-22 RX ADMIN — APIXABAN 5 MG: 5 TABLET, FILM COATED ORAL at 09:17

## 2018-11-22 ASSESSMENT — PAIN SCALES - GENERAL
PAINLEVEL_OUTOF10: 0

## 2018-11-22 ASSESSMENT — PAIN DESCRIPTION - DESCRIPTORS: DESCRIPTORS: DULL

## 2018-11-22 ASSESSMENT — PAIN DESCRIPTION - PAIN TYPE
TYPE: ACUTE PAIN
TYPE: ACUTE PAIN

## 2018-11-22 NOTE — PROGRESS NOTES
converted to sinus rhythm  2. Bradycardia, possible sick sinus syndrome ( Holter April 2018 with arun BLOCK, PVCs). 3. Coronary artery disease, prior history of LAD and left circumflex stent angioplasties 2001, repeat cardiac catheterization November 2016 with patent LAD stent, left circumflex ostial occlusion, right coronary 50% stenosis, medical treated. MED HX  HTN  Sleep Apnea  Diabetes   Obesity      Objective:   /67   Pulse 63   Temp 98.7 °F (37.1 °C) (Oral)   Resp 20   Ht 6' 1\" (1.854 m)   Wt 286 lb 9.6 oz (130 kg)   SpO2 94%   BMI 37.81 kg/m²    Wt Readings from Last 3 Encounters:   11/22/18 286 lb 9.6 oz (130 kg)   09/18/18 280 lb (127 kg)   01/05/18 289 lb (131.1 kg)       TELEMETRY: sinus bradycardia                            Rhythm Strip: no recurrent a.fb. Ave rate 50. No pauses. Low 40's with sleep  NYHA Class: II    Physical Exam:  General Appearance: alert and oriented to person, place and time, in no acute distress  Cardiovascular: normal rate, regular rhythm, normal S1 and S2, no murmurs, rubs, clicks, or gallops,  no JVD  Pulmonary/Chest: clear to auscultation bilaterally- no wheezes, rales or rhonchi, normal air movement, no respiratory distress  Abdomen: soft, non-tender, non-distended, normal bowel sounds, no masses   Extremities: no cyanosis, clubbing or edema. Right arm in mechanical sling.   Skin: warm and dry, no rash or erythema  Neck: supple and non-tender without mass, no thyromegaly   Neurological: alert, oriented, normal speech, no focal findings or movement disorder noted  Vasc; pulses 2+  Groin: no hematoma    Medications:    levothyroxine  50 mcg Oral Daily    ramipril  10 mg Oral Daily    rosuvastatin  10 mg Oral Daily    vitamin B-12  50 mcg Oral Daily    vitamin D  2,000 Units Oral Daily    sodium chloride flush  10 mL Intravenous 2 times per day    aspirin  81 mg Oral Daily    metoprolol succinate  25 mg Oral BID      sodium chloride 75 mL/hr at 11/21/18 2002       sodium chloride flush 10 mL PRN   sodium chloride flush 10 mL PRN   HYDROmorphone 1 mg Q15 Min PRN   nitroGLYCERIN 0.4 mg Q5 Min PRN   sodium chloride flush 10 mL PRN   acetaminophen 650 mg Q4H PRN   morphine 4 mg Q4H PRN   ondansetron 4 mg Q6H PRN   HYDROmorphone 1 mg Q4H PRN       Diagnostics:  EKG:  Sinus bradycardia  T wave abnormality, consider lateral ischemia    Echo: Normal left ventricle structure and function.   Mild concentric LVH   No segmental wall abn noted   Normal right ventricle structure and function.   Normal right ventricle systolic pressure.   No evidence of pericardial effusion.   Normal valvular structure and function.   No significant regurgitant or stenotic valvular lesions.   Trivial to 1+ AI not clinically significant.       Stress: Normal LVEF Moderately severe lateral ischemia    Lab Data:    Cardiac Enzymes:  Recent Labs      11/21/18   1359  11/21/18   1957  11/21/18   2352   TROPONINI  0.325*  0.287*  0.288*     ProBNP:   Lab Results   Component Value Date    PROBNP 159 11/20/2018       CBC:   Lab Results   Component Value Date    WBC 5.8 11/21/2018    RBC 4.36 11/21/2018    RBC 4.65 10/31/2011    HGB 14.4 11/21/2018    HCT 42.2 11/21/2018     11/21/2018       CMP:    Lab Results   Component Value Date     11/21/2018    K 4.9 11/21/2018     11/21/2018    CO2 25 11/21/2018    BUN 16 11/21/2018    CREATININE 0.94 11/21/2018    GFRAA >60.0 11/21/2018    LABGLOM >60.0 11/21/2018    GLUCOSE 119 11/21/2018    CALCIUM 9.6 11/21/2018       Hepatic Function Panel:    Lab Results   Component Value Date    ALKPHOS 72 11/21/2018    ALT 31 11/21/2018    AST 36 11/21/2018    PROT 6.0 11/21/2018    BILITOT 0.5 11/21/2018    LABALBU 3.8 11/21/2018       Magnesium:    Lab Results   Component Value Date    MG 2.1 11/21/2018       PT/INR:    Lab Results   Component Value Date    PROTIME 10.8 11/20/2018    INR 1.0 11/20/2018       Principal Problem:    Atrial

## 2018-11-23 LAB
EKG ATRIAL RATE: 137 BPM
EKG ATRIAL RATE: 47 BPM
EKG ATRIAL RATE: 47 BPM
EKG ATRIAL RATE: 48 BPM
EKG ATRIAL RATE: 49 BPM
EKG ATRIAL RATE: 50 BPM
EKG ATRIAL RATE: 52 BPM
EKG ATRIAL RATE: 53 BPM
EKG ATRIAL RATE: 54 BPM
EKG ATRIAL RATE: 55 BPM
EKG ATRIAL RATE: 57 BPM
EKG ATRIAL RATE: 58 BPM
EKG ATRIAL RATE: 62 BPM
EKG P AXIS: -10 DEGREES
EKG P AXIS: -13 DEGREES
EKG P AXIS: -15 DEGREES
EKG P AXIS: -17 DEGREES
EKG P AXIS: -24 DEGREES
EKG P AXIS: -9 DEGREES
EKG P AXIS: 1 DEGREES
EKG P AXIS: 14 DEGREES
EKG P AXIS: 16 DEGREES
EKG P AXIS: 28 DEGREES
EKG P AXIS: 32 DEGREES
EKG P AXIS: 41 DEGREES
EKG P-R INTERVAL: 168 MS
EKG P-R INTERVAL: 170 MS
EKG P-R INTERVAL: 170 MS
EKG P-R INTERVAL: 174 MS
EKG P-R INTERVAL: 174 MS
EKG P-R INTERVAL: 178 MS
EKG P-R INTERVAL: 182 MS
EKG P-R INTERVAL: 182 MS
EKG P-R INTERVAL: 184 MS
EKG P-R INTERVAL: 184 MS
EKG P-R INTERVAL: 186 MS
EKG P-R INTERVAL: 194 MS
EKG Q-T INTERVAL: 330 MS
EKG Q-T INTERVAL: 446 MS
EKG Q-T INTERVAL: 448 MS
EKG Q-T INTERVAL: 452 MS
EKG Q-T INTERVAL: 452 MS
EKG Q-T INTERVAL: 460 MS
EKG Q-T INTERVAL: 464 MS
EKG Q-T INTERVAL: 474 MS
EKG Q-T INTERVAL: 478 MS
EKG Q-T INTERVAL: 478 MS
EKG Q-T INTERVAL: 482 MS
EKG Q-T INTERVAL: 506 MS
EKG Q-T INTERVAL: 508 MS
EKG QRS DURATION: 86 MS
EKG QRS DURATION: 88 MS
EKG QRS DURATION: 90 MS
EKG QRS DURATION: 90 MS
EKG QRS DURATION: 92 MS
EKG QRS DURATION: 96 MS
EKG QRS DURATION: 98 MS
EKG QRS DURATION: 98 MS
EKG QTC CALCULATION (BAZETT): 396 MS
EKG QTC CALCULATION (BAZETT): 419 MS
EKG QTC CALCULATION (BAZETT): 419 MS
EKG QTC CALCULATION (BAZETT): 420 MS
EKG QTC CALCULATION (BAZETT): 434 MS
EKG QTC CALCULATION (BAZETT): 435 MS
EKG QTC CALCULATION (BAZETT): 443 MS
EKG QTC CALCULATION (BAZETT): 452 MS
EKG QTC CALCULATION (BAZETT): 453 MS
EKG QTC CALCULATION (BAZETT): 453 MS
EKG QTC CALCULATION (BAZETT): 466 MS
EKG QTC CALCULATION (BAZETT): 484 MS
EKG QTC CALCULATION (BAZETT): 498 MS
EKG R AXIS: -1 DEGREES
EKG R AXIS: -1 DEGREES
EKG R AXIS: -12 DEGREES
EKG R AXIS: -12 DEGREES
EKG R AXIS: -18 DEGREES
EKG R AXIS: -20 DEGREES
EKG R AXIS: -21 DEGREES
EKG R AXIS: -24 DEGREES
EKG R AXIS: -26 DEGREES
EKG R AXIS: -8 DEGREES
EKG R AXIS: -9 DEGREES
EKG R AXIS: -9 DEGREES
EKG R AXIS: 8 DEGREES
EKG T AXIS: 109 DEGREES
EKG T AXIS: 119 DEGREES
EKG T AXIS: 15 DEGREES
EKG T AXIS: 153 DEGREES
EKG T AXIS: 61 DEGREES
EKG T AXIS: 83 DEGREES
EKG T AXIS: 87 DEGREES
EKG T AXIS: 87 DEGREES
EKG T AXIS: 88 DEGREES
EKG T AXIS: 90 DEGREES
EKG T AXIS: 92 DEGREES
EKG T AXIS: 95 DEGREES
EKG T AXIS: 97 DEGREES
EKG VENTRICULAR RATE: 137 BPM
EKG VENTRICULAR RATE: 47 BPM
EKG VENTRICULAR RATE: 47 BPM
EKG VENTRICULAR RATE: 48 BPM
EKG VENTRICULAR RATE: 49 BPM
EKG VENTRICULAR RATE: 50 BPM
EKG VENTRICULAR RATE: 52 BPM
EKG VENTRICULAR RATE: 53 BPM
EKG VENTRICULAR RATE: 54 BPM
EKG VENTRICULAR RATE: 55 BPM
EKG VENTRICULAR RATE: 57 BPM
EKG VENTRICULAR RATE: 58 BPM
EKG VENTRICULAR RATE: 62 BPM
GLUCOSE BLD-MCNC: 102 MG/DL (ref 60–115)
GLUCOSE BLD-MCNC: 111 MG/DL (ref 60–115)
GLUCOSE BLD-MCNC: 83 MG/DL (ref 60–115)
GLUCOSE BLD-MCNC: 85 MG/DL (ref 60–115)
PERFORMED ON: NORMAL

## 2018-11-23 PROCEDURE — 6370000000 HC RX 637 (ALT 250 FOR IP): Performed by: INTERNAL MEDICINE

## 2018-11-23 PROCEDURE — 2580000003 HC RX 258: Performed by: INTERNAL MEDICINE

## 2018-11-23 PROCEDURE — 93005 ELECTROCARDIOGRAM TRACING: CPT

## 2018-11-23 PROCEDURE — 2060000000 HC ICU INTERMEDIATE R&B

## 2018-11-23 RX ORDER — METOPROLOL SUCCINATE 25 MG/1
25 TABLET, EXTENDED RELEASE ORAL DAILY
Status: DISCONTINUED | OUTPATIENT
Start: 2018-11-24 | End: 2018-11-24 | Stop reason: HOSPADM

## 2018-11-23 RX ADMIN — VITAMIN B12 0.1 MG ORAL TABLET 50 MCG: 0.1 TABLET ORAL at 09:04

## 2018-11-23 RX ADMIN — METFORMIN HYDROCHLORIDE 500 MG: 500 TABLET ORAL at 09:04

## 2018-11-23 RX ADMIN — LEVOTHYROXINE SODIUM 50 MCG: 50 TABLET ORAL at 06:55

## 2018-11-23 RX ADMIN — RAMIPRIL 10 MG: 5 CAPSULE ORAL at 09:04

## 2018-11-23 RX ADMIN — VITAMIN D, TAB 1000IU (100/BT) 2000 UNITS: 25 TAB at 09:04

## 2018-11-23 RX ADMIN — APIXABAN 5 MG: 5 TABLET, FILM COATED ORAL at 09:04

## 2018-11-23 RX ADMIN — DOFETILIDE 500 MCG: 0.5 CAPSULE ORAL at 09:03

## 2018-11-23 RX ADMIN — METOPROLOL SUCCINATE 50 MG: 50 TABLET, EXTENDED RELEASE ORAL at 09:04

## 2018-11-23 RX ADMIN — Medication 10 ML: at 20:29

## 2018-11-23 RX ADMIN — DOFETILIDE 500 MCG: 0.5 CAPSULE ORAL at 20:29

## 2018-11-23 RX ADMIN — APIXABAN 5 MG: 5 TABLET, FILM COATED ORAL at 20:28

## 2018-11-23 RX ADMIN — ASPIRIN 81 MG: 81 TABLET, COATED ORAL at 09:04

## 2018-11-23 RX ADMIN — ROSUVASTATIN 10 MG: 10 TABLET, FILM COATED ORAL at 09:04

## 2018-11-23 ASSESSMENT — PAIN SCALES - GENERAL
PAINLEVEL_OUTOF10: 0

## 2018-11-23 NOTE — PROGRESS NOTES
rhythm  2. Bradycardia, possible sick sinus syndrome ( Holter April 2018 with arun BLOCK, PVCs). 3. Coronary artery disease, prior history of LAD and left circumflex stent angioplasties 2001, repeat cardiac catheterization November 2016 with patent LAD stent, left circumflex ostial occlusion, right coronary 50% stenosis, medical treated. MED HX  HTN  Sleep Apnea  Diabetes   Obesity      Objective:   BP (!) 130/95   Pulse 57   Temp 97.2 °F (36.2 °C) (Oral)   Resp 18   Ht 6' 1\" (1.854 m)   Wt 286 lb 9.6 oz (130 kg)   SpO2 94%   BMI 37.81 kg/m²    Wt Readings from Last 3 Encounters:   11/22/18 286 lb 9.6 oz (130 kg)   09/18/18 280 lb (127 kg)   01/05/18 289 lb (131.1 kg)       TELEMETRY: sinus bradycardia                            Rhythm Strip: no recurrent a.fb. Ave rate 50. No pauses. Low 40's with sleep  NYHA Class: II    Physical Exam:  General Appearance: alert and oriented to person, place and time, in no acute distress  Cardiovascular: normal rate, regular rhythm, normal S1 and S2, no murmurs, rubs, clicks, or gallops,  no JVD  Pulmonary/Chest: clear to auscultation bilaterally- no wheezes, rales or rhonchi, normal air movement, no respiratory distress  Abdomen: soft, non-tender, non-distended, normal bowel sounds, no masses   Extremities: no cyanosis, clubbing or edema. Right arm in mechanical sling.   Skin: warm and dry, no rash or erythema  Neck: supple and non-tender without mass, no thyromegaly   Neurological: alert, oriented, normal speech, no focal findings or movement disorder noted  Vasc; pulses 2+  Groin: no hematoma    Medications:    metoprolol succinate  50 mg Oral Daily    dofetilide  500 mcg Oral 2 times per day    apixaban  5 mg Oral BID    metFORMIN  500 mg Oral Daily with breakfast    levothyroxine  50 mcg Oral Daily    ramipril  10 mg Oral Daily    rosuvastatin  10 mg Oral Daily    vitamin B-12  50 mcg Oral Daily    vitamin D  2,000 Units Oral Daily    sodium chloride

## 2018-11-24 VITALS
HEIGHT: 73 IN | RESPIRATION RATE: 18 BRPM | SYSTOLIC BLOOD PRESSURE: 142 MMHG | DIASTOLIC BLOOD PRESSURE: 71 MMHG | BODY MASS INDEX: 37.98 KG/M2 | OXYGEN SATURATION: 95 % | TEMPERATURE: 97.8 F | WEIGHT: 286.6 LBS | HEART RATE: 58 BPM

## 2018-11-24 PROBLEM — I21.4 NSTEMI (NON-ST ELEVATED MYOCARDIAL INFARCTION) (HCC): Status: ACTIVE | Noted: 2018-11-24

## 2018-11-24 LAB
GLUCOSE BLD-MCNC: 110 MG/DL (ref 60–115)
GLUCOSE BLD-MCNC: 84 MG/DL (ref 60–115)
PERFORMED ON: NORMAL
PERFORMED ON: NORMAL

## 2018-11-24 PROCEDURE — 6370000000 HC RX 637 (ALT 250 FOR IP): Performed by: INTERNAL MEDICINE

## 2018-11-24 PROCEDURE — 2580000003 HC RX 258: Performed by: INTERNAL MEDICINE

## 2018-11-24 PROCEDURE — 93005 ELECTROCARDIOGRAM TRACING: CPT

## 2018-11-24 RX ORDER — METOPROLOL SUCCINATE 25 MG/1
25 TABLET, EXTENDED RELEASE ORAL DAILY
Qty: 30 TABLET | Refills: 3 | Status: SHIPPED | OUTPATIENT
Start: 2018-11-25

## 2018-11-24 RX ORDER — DOFETILIDE 0.5 MG/1
500 CAPSULE ORAL EVERY 12 HOURS SCHEDULED
Qty: 60 CAPSULE | Refills: 3 | Status: SHIPPED | OUTPATIENT
Start: 2018-11-24 | End: 2020-05-12

## 2018-11-24 RX ADMIN — Medication 10 ML: at 08:44

## 2018-11-24 RX ADMIN — VITAMIN D, TAB 1000IU (100/BT) 2000 UNITS: 25 TAB at 08:44

## 2018-11-24 RX ADMIN — APIXABAN 5 MG: 5 TABLET, FILM COATED ORAL at 08:43

## 2018-11-24 RX ADMIN — RAMIPRIL 10 MG: 5 CAPSULE ORAL at 08:43

## 2018-11-24 RX ADMIN — LEVOTHYROXINE SODIUM 50 MCG: 50 TABLET ORAL at 06:38

## 2018-11-24 RX ADMIN — DOFETILIDE 500 MCG: 0.5 CAPSULE ORAL at 08:44

## 2018-11-24 RX ADMIN — ASPIRIN 81 MG: 81 TABLET, COATED ORAL at 08:44

## 2018-11-24 RX ADMIN — METOPROLOL SUCCINATE 25 MG: 25 TABLET, EXTENDED RELEASE ORAL at 08:42

## 2018-11-24 RX ADMIN — METFORMIN HYDROCHLORIDE 500 MG: 500 TABLET ORAL at 08:43

## 2018-11-24 RX ADMIN — VITAMIN B12 0.1 MG ORAL TABLET 50 MCG: 0.1 TABLET ORAL at 08:43

## 2018-11-24 RX ADMIN — ROSUVASTATIN 10 MG: 10 TABLET, FILM COATED ORAL at 08:44

## 2018-11-24 NOTE — FLOWSHEET NOTE
The  Patient is alert and oriented,no complain of pain,no respiratory distress,heart rate regular at 54 beats a minute. his right arm remain in an immobilizer,he moves his fingers well,that arm is swollen as compared to his left arm. his left Iv line is non-functional.a new iv site was located into his left basilic vein on one attempt. he tolerated the iv insertion well. the line has excellent blood return and flushes easily.

## 2018-11-24 NOTE — DISCHARGE SUMMARY
Cardiology Discharge Note      Patient:  Kd Escobar  YOB: 1950  MRN: [id number]   Acct: [de-identified]  Admit Date:  11/20/2018   Discharge Date: 11/24/2018  Primary Cardiologist:Dr. Stephani Pierson      Principal Problem:    Atrial fibrillation with RVR (HCC)-new onset  Active Problems:    Coronary artery disease involving native coronary artery-cath with stable anatomy    Essential hypertension    Sick sinus syndrome (HCC)    NSTEMI (non-ST elevated myocardial infarction) ([facility])-no change in coronaries, related to a.fib    Procedures; Cath as noted below, no complications      Impression/Plan:  1. NSTEMI: Significant elevation of cardiac enzymes with lateral ischemia on ecg and stress. No angina since admit. Cath with stable disease-NSTEMI due to effects of a.fib with RVR  2. CAD:  No significant progression at cath yesterday. No comp of cath. 3. Atrial fib: Thyroid normal. No recurrence now with 5 doses tikosyn. QTc still <500. On eliquis without bleeding  4. HTN: controlled. 5. Sinus virgen; resume toprol at 25, had been dizzy on 50 in past due to low hr.  6. Home today    Chief Complaint/Active Symptoms: No angina/dyspnea/palpitations. No palpitations. No groin pain. No dizziness. PROCEDURE: Cath 11/21/2018  : dr. Wilbert Runner. Complications: none  1. Left ventricular end diastolic pressure was 8 to 10 mmHg. No systolic gradient across the aortic valve. 2. Left ventriculography revealed an EF around 55%. No apparent regional wall motion abnormality is noted in the [place] view. 3. The left main coronary artery has less than 20% stenosis. 4. The left anterior descending artery is has 30% ostial / proximal stenosis,20 to 30% stenosis in the proximal mid segment at the origin of the major diagonal branch,0% distal stenosis. 5. Left circumflex artery is 1005 chronically occluded at its ostium,and is a previously stented vessel.   6. Entire left circumflex artery fills via grade 3 collaterals

## 2018-11-28 PROCEDURE — 93010 ELECTROCARDIOGRAM REPORT: CPT | Performed by: INTERNAL MEDICINE

## 2018-12-11 ENCOUNTER — HOSPITAL ENCOUNTER (OUTPATIENT)
Dept: CARDIAC REHAB | Age: 68
Setting detail: THERAPIES SERIES
Discharge: HOME | End: 2018-12-11
Payer: MEDICARE

## 2018-12-11 PROCEDURE — 93798 PHYS/QHP OP CAR RHAB W/ECG: CPT

## 2018-12-12 ENCOUNTER — HOSPITAL ENCOUNTER (OUTPATIENT)
Dept: CARDIAC REHAB | Age: 68
Setting detail: THERAPIES SERIES
Discharge: HOME | End: 2018-12-12
Payer: MEDICARE

## 2018-12-12 PROCEDURE — 93798 PHYS/QHP OP CAR RHAB W/ECG: CPT

## 2018-12-14 ENCOUNTER — HOSPITAL ENCOUNTER (OUTPATIENT)
Dept: CARDIAC REHAB | Age: 68
Setting detail: THERAPIES SERIES
Discharge: HOME | End: 2018-12-14
Payer: MEDICARE

## 2018-12-14 PROCEDURE — 93798 PHYS/QHP OP CAR RHAB W/ECG: CPT

## 2018-12-17 ENCOUNTER — HOSPITAL ENCOUNTER (OUTPATIENT)
Dept: CARDIAC REHAB | Age: 68
Setting detail: THERAPIES SERIES
Discharge: HOME | End: 2018-12-17
Payer: MEDICARE

## 2018-12-17 PROCEDURE — 93798 PHYS/QHP OP CAR RHAB W/ECG: CPT

## 2018-12-19 ENCOUNTER — HOSPITAL ENCOUNTER (OUTPATIENT)
Dept: CARDIAC REHAB | Age: 68
Setting detail: THERAPIES SERIES
Discharge: HOME | End: 2018-12-19
Payer: MEDICARE

## 2018-12-19 PROCEDURE — 93798 PHYS/QHP OP CAR RHAB W/ECG: CPT

## 2018-12-21 ENCOUNTER — HOSPITAL ENCOUNTER (OUTPATIENT)
Dept: CARDIAC REHAB | Age: 68
Setting detail: THERAPIES SERIES
Discharge: HOME | End: 2018-12-21
Payer: MEDICARE

## 2018-12-21 PROCEDURE — 93798 PHYS/QHP OP CAR RHAB W/ECG: CPT

## 2018-12-24 ENCOUNTER — APPOINTMENT (OUTPATIENT)
Dept: CARDIAC REHAB | Age: 68
End: 2018-12-24
Payer: MEDICARE

## 2018-12-26 ENCOUNTER — HOSPITAL ENCOUNTER (OUTPATIENT)
Dept: CARDIAC REHAB | Age: 68
Setting detail: THERAPIES SERIES
Discharge: HOME | End: 2018-12-26
Payer: MEDICARE

## 2018-12-26 PROCEDURE — 93798 PHYS/QHP OP CAR RHAB W/ECG: CPT

## 2018-12-28 ENCOUNTER — HOSPITAL ENCOUNTER (OUTPATIENT)
Dept: CARDIAC REHAB | Age: 68
Setting detail: THERAPIES SERIES
Discharge: HOME | End: 2018-12-28
Payer: MEDICARE

## 2018-12-28 PROCEDURE — 93798 PHYS/QHP OP CAR RHAB W/ECG: CPT

## 2018-12-31 ENCOUNTER — HOSPITAL ENCOUNTER (OUTPATIENT)
Dept: CARDIAC REHAB | Age: 68
Setting detail: THERAPIES SERIES
Discharge: HOME | End: 2018-12-31
Payer: MEDICARE

## 2018-12-31 PROCEDURE — 93798 PHYS/QHP OP CAR RHAB W/ECG: CPT

## 2019-01-02 ENCOUNTER — HOSPITAL ENCOUNTER (OUTPATIENT)
Dept: CARDIAC REHAB | Age: 69
Setting detail: THERAPIES SERIES
Discharge: HOME | End: 2019-01-02
Payer: MEDICARE

## 2019-01-02 PROCEDURE — 93798 PHYS/QHP OP CAR RHAB W/ECG: CPT

## 2019-01-04 ENCOUNTER — HOSPITAL ENCOUNTER (OUTPATIENT)
Dept: CARDIAC REHAB | Age: 69
Setting detail: THERAPIES SERIES
Discharge: HOME | End: 2019-01-04
Payer: MEDICARE

## 2019-01-04 PROCEDURE — 93798 PHYS/QHP OP CAR RHAB W/ECG: CPT

## 2019-01-07 ENCOUNTER — HOSPITAL ENCOUNTER (OUTPATIENT)
Dept: CARDIAC REHAB | Age: 69
Setting detail: THERAPIES SERIES
Discharge: HOME | End: 2019-01-07
Payer: MEDICARE

## 2019-01-07 PROCEDURE — 93798 PHYS/QHP OP CAR RHAB W/ECG: CPT

## 2019-01-09 ENCOUNTER — HOSPITAL ENCOUNTER (OUTPATIENT)
Dept: CARDIAC REHAB | Age: 69
Setting detail: THERAPIES SERIES
Discharge: HOME | End: 2019-01-09
Payer: MEDICARE

## 2019-01-09 PROCEDURE — 93798 PHYS/QHP OP CAR RHAB W/ECG: CPT

## 2019-01-11 ENCOUNTER — HOSPITAL ENCOUNTER (OUTPATIENT)
Dept: CARDIAC REHAB | Age: 69
Setting detail: THERAPIES SERIES
Discharge: HOME | End: 2019-01-11
Payer: MEDICARE

## 2019-01-11 PROCEDURE — 93798 PHYS/QHP OP CAR RHAB W/ECG: CPT

## 2019-01-14 ENCOUNTER — HOSPITAL ENCOUNTER (OUTPATIENT)
Dept: CARDIAC REHAB | Age: 69
Setting detail: THERAPIES SERIES
Discharge: HOME | End: 2019-01-14
Payer: MEDICARE

## 2019-01-14 PROCEDURE — 93798 PHYS/QHP OP CAR RHAB W/ECG: CPT

## 2019-01-16 ENCOUNTER — HOSPITAL ENCOUNTER (OUTPATIENT)
Dept: CARDIAC REHAB | Age: 69
Setting detail: THERAPIES SERIES
Discharge: HOME | End: 2019-01-16
Payer: MEDICARE

## 2019-01-16 PROCEDURE — 93798 PHYS/QHP OP CAR RHAB W/ECG: CPT

## 2019-01-18 ENCOUNTER — HOSPITAL ENCOUNTER (OUTPATIENT)
Dept: CARDIAC REHAB | Age: 69
Setting detail: THERAPIES SERIES
Discharge: HOME | End: 2019-01-18
Payer: MEDICARE

## 2019-01-18 ENCOUNTER — TELEPHONE (OUTPATIENT)
Dept: UROLOGY | Age: 69
End: 2019-01-18

## 2019-01-18 DIAGNOSIS — Z85.46 HISTORY OF PROSTATE CANCER: Primary | ICD-10-CM

## 2019-01-18 DIAGNOSIS — Z85.46 HISTORY OF PROSTATE CANCER: ICD-10-CM

## 2019-01-18 LAB — PROSTATE SPECIFIC ANTIGEN: 0.05 NG/ML (ref 0–6.22)

## 2019-01-18 PROCEDURE — 93798 PHYS/QHP OP CAR RHAB W/ECG: CPT

## 2019-01-21 ENCOUNTER — HOSPITAL ENCOUNTER (OUTPATIENT)
Dept: CARDIAC REHAB | Age: 69
Setting detail: THERAPIES SERIES
Discharge: HOME | End: 2019-01-21
Payer: MEDICARE

## 2019-01-21 PROCEDURE — 93798 PHYS/QHP OP CAR RHAB W/ECG: CPT

## 2019-01-23 ENCOUNTER — HOSPITAL ENCOUNTER (OUTPATIENT)
Dept: CARDIAC REHAB | Age: 69
Setting detail: THERAPIES SERIES
Discharge: HOME | End: 2019-01-23
Payer: MEDICARE

## 2019-01-23 PROCEDURE — 93798 PHYS/QHP OP CAR RHAB W/ECG: CPT

## 2019-01-25 ENCOUNTER — OFFICE VISIT (OUTPATIENT)
Dept: UROLOGY | Age: 69
End: 2019-01-25
Payer: MEDICARE

## 2019-01-25 VITALS
HEART RATE: 68 BPM | DIASTOLIC BLOOD PRESSURE: 82 MMHG | HEIGHT: 73 IN | SYSTOLIC BLOOD PRESSURE: 136 MMHG | BODY MASS INDEX: 37.11 KG/M2 | WEIGHT: 280 LBS

## 2019-01-25 DIAGNOSIS — Z85.46 H/O PROSTATE CANCER: Primary | ICD-10-CM

## 2019-01-25 PROCEDURE — G8427 DOCREV CUR MEDS BY ELIG CLIN: HCPCS | Performed by: UROLOGY

## 2019-01-25 PROCEDURE — 4040F PNEUMOC VAC/ADMIN/RCVD: CPT | Performed by: UROLOGY

## 2019-01-25 PROCEDURE — G8484 FLU IMMUNIZE NO ADMIN: HCPCS | Performed by: UROLOGY

## 2019-01-25 PROCEDURE — 1101F PT FALLS ASSESS-DOCD LE1/YR: CPT | Performed by: UROLOGY

## 2019-01-25 PROCEDURE — 3017F COLORECTAL CA SCREEN DOC REV: CPT | Performed by: UROLOGY

## 2019-01-25 PROCEDURE — 1036F TOBACCO NON-USER: CPT | Performed by: UROLOGY

## 2019-01-25 PROCEDURE — G8598 ASA/ANTIPLAT THER USED: HCPCS | Performed by: UROLOGY

## 2019-01-25 PROCEDURE — G8417 CALC BMI ABV UP PARAM F/U: HCPCS | Performed by: UROLOGY

## 2019-01-25 PROCEDURE — 1123F ACP DISCUSS/DSCN MKR DOCD: CPT | Performed by: UROLOGY

## 2019-01-25 PROCEDURE — 99213 OFFICE O/P EST LOW 20 MIN: CPT | Performed by: UROLOGY

## 2019-01-25 ASSESSMENT — ENCOUNTER SYMPTOMS
ABDOMINAL PAIN: 0
SHORTNESS OF BREATH: 0
ABDOMINAL DISTENTION: 0

## 2019-01-28 ENCOUNTER — HOSPITAL ENCOUNTER (OUTPATIENT)
Dept: CARDIAC REHAB | Age: 69
Setting detail: THERAPIES SERIES
Discharge: HOME | End: 2019-01-28
Payer: MEDICARE

## 2019-01-28 PROCEDURE — 93798 PHYS/QHP OP CAR RHAB W/ECG: CPT

## 2019-01-30 ENCOUNTER — HOSPITAL ENCOUNTER (OUTPATIENT)
Dept: CARDIAC REHAB | Age: 69
Setting detail: THERAPIES SERIES
Discharge: HOME | End: 2019-01-30
Payer: MEDICARE

## 2019-01-30 PROCEDURE — 93798 PHYS/QHP OP CAR RHAB W/ECG: CPT

## 2019-02-01 ENCOUNTER — HOSPITAL ENCOUNTER (OUTPATIENT)
Dept: CARDIAC REHAB | Age: 69
Setting detail: THERAPIES SERIES
Discharge: HOME | End: 2019-02-01
Payer: MEDICARE

## 2019-02-01 PROCEDURE — 93798 PHYS/QHP OP CAR RHAB W/ECG: CPT

## 2019-02-06 ENCOUNTER — HOSPITAL ENCOUNTER (OUTPATIENT)
Dept: CARDIAC REHAB | Age: 69
Setting detail: THERAPIES SERIES
Discharge: HOME | End: 2019-02-06
Payer: MEDICARE

## 2019-02-06 PROCEDURE — 93798 PHYS/QHP OP CAR RHAB W/ECG: CPT

## 2019-02-08 ENCOUNTER — HOSPITAL ENCOUNTER (OUTPATIENT)
Dept: CARDIAC REHAB | Age: 69
Setting detail: THERAPIES SERIES
Discharge: HOME | End: 2019-02-08
Payer: MEDICARE

## 2019-02-08 PROCEDURE — 93798 PHYS/QHP OP CAR RHAB W/ECG: CPT

## 2019-02-11 ENCOUNTER — HOSPITAL ENCOUNTER (OUTPATIENT)
Dept: CARDIAC REHAB | Age: 69
Setting detail: THERAPIES SERIES
Discharge: HOME | End: 2019-02-11
Payer: MEDICARE

## 2019-02-11 PROCEDURE — 93798 PHYS/QHP OP CAR RHAB W/ECG: CPT

## 2019-02-13 ENCOUNTER — HOSPITAL ENCOUNTER (OUTPATIENT)
Dept: CARDIAC REHAB | Age: 69
Setting detail: THERAPIES SERIES
Discharge: HOME | End: 2019-02-13
Payer: MEDICARE

## 2019-02-13 PROCEDURE — 93798 PHYS/QHP OP CAR RHAB W/ECG: CPT

## 2019-02-15 ENCOUNTER — HOSPITAL ENCOUNTER (OUTPATIENT)
Dept: CARDIAC REHAB | Age: 69
Setting detail: THERAPIES SERIES
Discharge: HOME | End: 2019-02-15
Payer: MEDICARE

## 2019-02-15 PROCEDURE — 93798 PHYS/QHP OP CAR RHAB W/ECG: CPT

## 2019-02-18 ENCOUNTER — HOSPITAL ENCOUNTER (OUTPATIENT)
Dept: CARDIAC REHAB | Age: 69
Setting detail: THERAPIES SERIES
Discharge: HOME | End: 2019-02-18
Payer: MEDICARE

## 2019-02-18 PROCEDURE — 93798 PHYS/QHP OP CAR RHAB W/ECG: CPT

## 2019-02-20 ENCOUNTER — HOSPITAL ENCOUNTER (OUTPATIENT)
Dept: CARDIAC REHAB | Age: 69
Setting detail: THERAPIES SERIES
Discharge: HOME | End: 2019-02-20
Payer: MEDICARE

## 2019-02-20 PROCEDURE — 93798 PHYS/QHP OP CAR RHAB W/ECG: CPT

## 2019-02-22 ENCOUNTER — HOSPITAL ENCOUNTER (OUTPATIENT)
Dept: CARDIAC REHAB | Age: 69
Setting detail: THERAPIES SERIES
Discharge: HOME | End: 2019-02-22
Payer: MEDICARE

## 2019-02-22 PROCEDURE — 93798 PHYS/QHP OP CAR RHAB W/ECG: CPT

## 2019-02-25 ENCOUNTER — HOSPITAL ENCOUNTER (OUTPATIENT)
Dept: CARDIAC REHAB | Age: 69
Setting detail: THERAPIES SERIES
Discharge: HOME | End: 2019-02-25
Payer: MEDICARE

## 2019-02-25 PROCEDURE — 93798 PHYS/QHP OP CAR RHAB W/ECG: CPT

## 2019-02-27 ENCOUNTER — HOSPITAL ENCOUNTER (OUTPATIENT)
Dept: CARDIAC REHAB | Age: 69
Setting detail: THERAPIES SERIES
Discharge: HOME | End: 2019-02-27
Payer: MEDICARE

## 2019-02-27 PROCEDURE — 93798 PHYS/QHP OP CAR RHAB W/ECG: CPT

## 2019-03-01 ENCOUNTER — HOSPITAL ENCOUNTER (OUTPATIENT)
Dept: CARDIAC REHAB | Age: 69
Setting detail: THERAPIES SERIES
Discharge: HOME | End: 2019-03-01
Payer: MEDICARE

## 2019-03-01 PROCEDURE — 93798 PHYS/QHP OP CAR RHAB W/ECG: CPT

## 2019-03-04 ENCOUNTER — HOSPITAL ENCOUNTER (OUTPATIENT)
Dept: CARDIAC REHAB | Age: 69
Setting detail: THERAPIES SERIES
Discharge: HOME | End: 2019-03-04
Payer: MEDICARE

## 2019-03-04 PROCEDURE — 93798 PHYS/QHP OP CAR RHAB W/ECG: CPT

## 2019-03-06 ENCOUNTER — HOSPITAL ENCOUNTER (OUTPATIENT)
Dept: CARDIAC REHAB | Age: 69
Setting detail: THERAPIES SERIES
Discharge: HOME | End: 2019-03-06
Payer: MEDICARE

## 2019-03-06 PROCEDURE — 93798 PHYS/QHP OP CAR RHAB W/ECG: CPT

## 2019-03-08 ENCOUNTER — HOSPITAL ENCOUNTER (OUTPATIENT)
Dept: CARDIAC REHAB | Age: 69
Setting detail: THERAPIES SERIES
Discharge: HOME | End: 2019-03-08
Payer: MEDICARE

## 2019-03-08 PROCEDURE — 93798 PHYS/QHP OP CAR RHAB W/ECG: CPT

## 2019-10-01 ENCOUNTER — HOSPITAL ENCOUNTER (OUTPATIENT)
Dept: CARDIAC REHAB | Age: 69
Discharge: HOME | End: 2019-10-01

## 2019-10-01 PROCEDURE — 9900000038 HC CARDIAC REHAB PHASE 3 - MONTHLY

## 2020-01-14 DIAGNOSIS — Z85.46 H/O PROSTATE CANCER: ICD-10-CM

## 2020-01-14 LAB — PROSTATE SPECIFIC ANTIGEN: 0.06 NG/ML (ref 0–6.22)

## 2020-01-24 ENCOUNTER — OFFICE VISIT (OUTPATIENT)
Dept: UROLOGY | Age: 70
End: 2020-01-24
Payer: MEDICARE

## 2020-01-24 VITALS
WEIGHT: 285 LBS | HEART RATE: 61 BPM | DIASTOLIC BLOOD PRESSURE: 82 MMHG | SYSTOLIC BLOOD PRESSURE: 142 MMHG | BODY MASS INDEX: 37.77 KG/M2 | HEIGHT: 73 IN

## 2020-01-24 PROCEDURE — 4040F PNEUMOC VAC/ADMIN/RCVD: CPT | Performed by: UROLOGY

## 2020-01-24 PROCEDURE — G8417 CALC BMI ABV UP PARAM F/U: HCPCS | Performed by: UROLOGY

## 2020-01-24 PROCEDURE — 1036F TOBACCO NON-USER: CPT | Performed by: UROLOGY

## 2020-01-24 PROCEDURE — 1123F ACP DISCUSS/DSCN MKR DOCD: CPT | Performed by: UROLOGY

## 2020-01-24 PROCEDURE — G8484 FLU IMMUNIZE NO ADMIN: HCPCS | Performed by: UROLOGY

## 2020-01-24 PROCEDURE — 99214 OFFICE O/P EST MOD 30 MIN: CPT | Performed by: UROLOGY

## 2020-01-24 PROCEDURE — 3017F COLORECTAL CA SCREEN DOC REV: CPT | Performed by: UROLOGY

## 2020-01-24 PROCEDURE — G8427 DOCREV CUR MEDS BY ELIG CLIN: HCPCS | Performed by: UROLOGY

## 2020-01-24 ASSESSMENT — ENCOUNTER SYMPTOMS
ABDOMINAL PAIN: 0
ABDOMINAL DISTENTION: 0

## 2020-01-24 NOTE — PROGRESS NOTES
Subjective:      Patient ID: Buddy Hall is a 78 y.o. male. HPI  This is a 71 yo white male with h/o HTN, CAD/Stents, AFib on Eliquis/Tikosyn, and [place] 7 prostate cancer s/p RALP (rt nerve sparing/angeles LND) on 12. Since last seen on 19, he has no frequency or urgency and feels the bladder empties. He denies RAYMUNDO. He has no hematuria or dysuria. He has no abd or flank pain and no wt loss or abnl bone pains. He has no interval medical or surgical problems. I reviewed his interval PSA today.      PATH: LN: neg, [place] 4+3 = 7 angeles with PN invasion, margins neg, capsule neg, sv neg     Past Medical History:   Diagnosis Date    Atrial fibrillation ([address]     Erectile dysfunction     Heart attack ([facility] [address] 2018    Hyperlipidemia     Hypertension     MI (myocardial infarction) ([facility] [address]     Prostate cancer ([facility] [address] 2011    Prostatectomy Dr. Silver Nyhan    Type II or unspecified type diabetes mellitus without mention of complication, not stated as uncontrolled     Unspecified sleep apnea     CPAP Machine     Past Surgical History:   Procedure Laterality Date    CARDIAC CATHETERIZATION  2016    COLONOSCOPY  2009    Dr. Lenny Payne      3 stents place post Heart Attack    EYE SURGERY Bilateral 2015 10/2015    cataract   Kristine Rosa      Dr. Marcello Vivar. Yu Running History     Socioeconomic History    Marital status:      Spouse name: None    Number of children: None    Years of education: None    Highest education level: None   Occupational History    None   Social Needs    Financial resource strain: None    Food insecurity:     Worry: None     Inability: None    Transportation needs:     Medical: None     Non-medical: None   Tobacco Use    Smoking status: Former Smoker     Last attempt to quit: 1998     Years since quittin.9    Smokeless tobacco: Never Used   Substance and Sexual Activity    Alcohol use:  Yes Franca Wilson MD

## 2020-02-26 LAB
ALBUMIN SERPL-MCNC: 4.4 G/DL (ref 3.5–4.6)
ALP BLD-CCNC: 81 U/L (ref 35–104)
ALT SERPL-CCNC: 37 U/L (ref 0–41)
ANION GAP SERPL CALCULATED.3IONS-SCNC: 13 MEQ/L (ref 9–15)
AST SERPL-CCNC: 22 U/L (ref 0–40)
BILIRUB SERPL-MCNC: 0.6 MG/DL (ref 0.2–0.7)
BUN BLDV-MCNC: 18 MG/DL (ref 8–23)
CALCIUM SERPL-MCNC: 9.7 MG/DL (ref 8.5–9.9)
CHLORIDE BLD-SCNC: 99 MEQ/L (ref 95–107)
CHOLESTEROL, FASTING: 122 MG/DL (ref 0–199)
CO2: 26 MEQ/L (ref 20–31)
CREAT SERPL-MCNC: 1.01 MG/DL (ref 0.7–1.2)
GFR AFRICAN AMERICAN: >60
GFR NON-AFRICAN AMERICAN: >60
GLOBULIN: 2.9 G/DL (ref 2.3–3.5)
GLUCOSE BLD-MCNC: 108 MG/DL (ref 70–99)
HCT VFR BLD CALC: 49.4 % (ref 42–52)
HDLC SERPL-MCNC: 42 MG/DL (ref 40–59)
HEMOGLOBIN: 16.4 G/DL (ref 14–18)
LDL CHOLESTEROL CALCULATED: 56 MG/DL (ref 0–129)
MAGNESIUM: 2.2 MG/DL (ref 1.7–2.4)
MCH RBC QN AUTO: 32.2 PG (ref 27–31.3)
MCHC RBC AUTO-ENTMCNC: 33.2 % (ref 33–37)
MCV RBC AUTO: 97 FL (ref 80–100)
PDW BLD-RTO: 13.3 % (ref 11.5–14.5)
PLATELET # BLD: 159 K/UL (ref 130–400)
POTASSIUM SERPL-SCNC: 4.3 MEQ/L (ref 3.4–4.9)
RBC # BLD: 5.09 M/UL (ref 4.7–6.1)
SODIUM BLD-SCNC: 138 MEQ/L (ref 135–144)
TOTAL PROTEIN: 7.3 G/DL (ref 6.3–8)
TRIGLYCERIDE, FASTING: 122 MG/DL (ref 0–150)
WBC # BLD: 5.5 K/UL (ref 4.8–10.8)

## 2020-05-12 ENCOUNTER — OFFICE VISIT (OUTPATIENT)
Dept: NEUROLOGY | Age: 70
End: 2020-05-12
Payer: MEDICARE

## 2020-05-12 VITALS — DIASTOLIC BLOOD PRESSURE: 75 MMHG | SYSTOLIC BLOOD PRESSURE: 142 MMHG | HEART RATE: 55 BPM

## 2020-05-12 PROBLEM — G89.29 CHRONIC PAIN OF BOTH KNEES: Status: ACTIVE | Noted: 2017-07-07

## 2020-05-12 PROBLEM — G62.9 NEUROPATHY: Status: ACTIVE | Noted: 2019-02-18

## 2020-05-12 PROBLEM — E88.81 METABOLIC SYNDROME: Status: ACTIVE | Noted: 2020-05-12

## 2020-05-12 PROBLEM — R00.1 BRADYCARDIA: Status: ACTIVE | Noted: 2020-05-12

## 2020-05-12 PROBLEM — M25.561 CHRONIC PAIN OF BOTH KNEES: Status: ACTIVE | Noted: 2017-07-07

## 2020-05-12 PROBLEM — M85.80 OSTEOPENIA: Status: ACTIVE | Noted: 2020-05-12

## 2020-05-12 PROBLEM — N52.9 ERECTILE DYSFUNCTION: Status: ACTIVE | Noted: 2020-05-12

## 2020-05-12 PROBLEM — E55.9 VITAMIN D DEFICIENCY: Status: ACTIVE | Noted: 2020-05-12

## 2020-05-12 PROBLEM — S42.409A ELBOW FRACTURE: Status: ACTIVE | Noted: 2018-09-18

## 2020-05-12 PROBLEM — M20.41 HAMMER TOES OF BOTH FEET: Status: ACTIVE | Noted: 2019-02-18

## 2020-05-12 PROBLEM — M17.0 PRIMARY OSTEOARTHRITIS OF BOTH KNEES: Status: ACTIVE | Noted: 2017-07-07

## 2020-05-12 PROBLEM — S52.024A: Status: ACTIVE | Noted: 2018-09-18

## 2020-05-12 PROBLEM — E03.1 CONGENITAL HYPOTHYROIDISM WITHOUT GOITER: Status: ACTIVE | Noted: 2017-09-19

## 2020-05-12 PROBLEM — I25.10 CORONARY ARTERY DISEASE INVOLVING NATIVE CORONARY ARTERY OF NATIVE HEART WITHOUT ANGINA PECTORIS: Status: ACTIVE | Noted: 2017-09-19

## 2020-05-12 PROBLEM — E66.812 OBESITY, CLASS II, BMI 35-39.9: Status: ACTIVE | Noted: 2018-03-22

## 2020-05-12 PROBLEM — E88.810 METABOLIC SYNDROME: Status: ACTIVE | Noted: 2020-05-12

## 2020-05-12 PROBLEM — M79.672 PAIN IN BOTH FEET: Status: ACTIVE | Noted: 2019-02-18

## 2020-05-12 PROBLEM — M54.2 NECK PAIN: Status: ACTIVE | Noted: 2020-05-12

## 2020-05-12 PROBLEM — M25.562 CHRONIC PAIN OF BOTH KNEES: Status: ACTIVE | Noted: 2017-07-07

## 2020-05-12 PROBLEM — E66.9 OBESITY, CLASS II, BMI 35-39.9: Status: ACTIVE | Noted: 2018-03-22

## 2020-05-12 PROBLEM — L71.9 ROSACEA: Status: ACTIVE | Noted: 2020-05-12

## 2020-05-12 PROBLEM — M20.42 HAMMER TOES OF BOTH FEET: Status: ACTIVE | Noted: 2019-02-18

## 2020-05-12 PROBLEM — M65.30 LEFT TRIGGER FINGER: Status: ACTIVE | Noted: 2020-05-12

## 2020-05-12 PROBLEM — Z87.2 HISTORY OF ACTINIC KERATOSES: Status: ACTIVE | Noted: 2019-02-26

## 2020-05-12 PROBLEM — M79.671 PAIN IN BOTH FEET: Status: ACTIVE | Noted: 2019-02-18

## 2020-05-12 PROBLEM — Z92.89: Status: ACTIVE | Noted: 2019-02-26

## 2020-05-12 PROBLEM — J30.9 ALLERGIC RHINITIS, UNSPECIFIED: Status: ACTIVE | Noted: 2019-05-06

## 2020-05-12 PROBLEM — L57.0 ACTINIC KERATOSES: Status: ACTIVE | Noted: 2020-05-12

## 2020-05-12 PROCEDURE — 3017F COLORECTAL CA SCREEN DOC REV: CPT | Performed by: NURSE PRACTITIONER

## 2020-05-12 PROCEDURE — 2022F DILAT RTA XM EVC RTNOPTHY: CPT | Performed by: NURSE PRACTITIONER

## 2020-05-12 PROCEDURE — G8417 CALC BMI ABV UP PARAM F/U: HCPCS | Performed by: NURSE PRACTITIONER

## 2020-05-12 PROCEDURE — 1123F ACP DISCUSS/DSCN MKR DOCD: CPT | Performed by: NURSE PRACTITIONER

## 2020-05-12 PROCEDURE — 4040F PNEUMOC VAC/ADMIN/RCVD: CPT | Performed by: NURSE PRACTITIONER

## 2020-05-12 PROCEDURE — 99205 OFFICE O/P NEW HI 60 MIN: CPT | Performed by: NURSE PRACTITIONER

## 2020-05-12 PROCEDURE — G8427 DOCREV CUR MEDS BY ELIG CLIN: HCPCS | Performed by: NURSE PRACTITIONER

## 2020-05-12 PROCEDURE — 3046F HEMOGLOBIN A1C LEVEL >9.0%: CPT | Performed by: NURSE PRACTITIONER

## 2020-05-12 PROCEDURE — 1036F TOBACCO NON-USER: CPT | Performed by: NURSE PRACTITIONER

## 2020-05-12 RX ORDER — DOFETILIDE 0.12 MG/1
125 CAPSULE ORAL 2 TIMES DAILY
COMMUNITY
Start: 2020-05-04

## 2020-05-12 RX ORDER — DOFETILIDE 0.25 MG/1
250 CAPSULE ORAL 2 TIMES DAILY
COMMUNITY
Start: 2020-05-04

## 2020-05-12 RX ORDER — MECLIZINE HYDROCHLORIDE 25 MG/1
25 TABLET ORAL 3 TIMES DAILY PRN
Qty: 30 TABLET | Refills: 0 | Status: SHIPPED | OUTPATIENT
Start: 2020-05-12 | End: 2020-05-22

## 2020-05-12 RX ORDER — BLOOD-GLUCOSE METER
KIT MISCELLANEOUS
COMMUNITY
Start: 2020-02-13

## 2020-05-12 ASSESSMENT — ENCOUNTER SYMPTOMS
NAUSEA: 0
CONSTIPATION: 0
SINUS PAIN: 0
DIARRHEA: 0
TROUBLE SWALLOWING: 0
COUGH: 0
SINUS PRESSURE: 0
ABDOMINAL PAIN: 0
CHEST TIGHTNESS: 0
WHEEZING: 0
RHINORRHEA: 0
SHORTNESS OF BREATH: 0
COLOR CHANGE: 0
VOMITING: 0
ABDOMINAL DISTENTION: 0

## 2020-05-12 NOTE — PROGRESS NOTES
Subjective:      Patient ID: Ayden Hardy is a 69 y.o. male who presents today for:  Chief Complaint   Patient presents with    New Patient     Patient states that in early march he laid down and felt like he was very dizzy. Woke up and felt the same way. This has continued and every once in a while when he is walking it will do the same thing the speels last 3-5 sec max of like 10 sec. HPI  Pt seen and examined in the office for new patient visit for vertigo. He is a 76-year-old  male with past medical history of sleep apnea using CPAP, diabetes mellitus type 2, prostate cancer, CAD status post MI x2, hypertension, hyperlipidemia, atrial fibrillation on Tikosyn and Eliquis. Patient reports that he developed sudden onset of vertigo in early March 2020. He does describe it as a sensation of spinning and movement rather than lightheadedness. Patient states that dizziness occurs usually right after lying down and lasts for approximately 3 to 5 seconds and then occurs again right after sitting up out of bed in the morning and lasts again for about 3 to 5 seconds. He denies any associated nausea or vomiting, diaphoresis. Denied any illness or medication changes at the time that his symptoms started. Denies otalgia, tinnitus, hearing loss, fever, visual changes or diplopia, headache, dysarthria, dysphasia, paresthesias, one-sided weakness. He denies any changes in gait, ataxia, or recent falls. Denies any recent or remote head injury. No unusual weight loss. No reports of tremor or hallucinations. He did see his cardiologist, Dr. Tashi Andino, for his symptoms who made some adjustments to his beta-blocker. And does have low heart rate at baseline running in the 40 to 50s range. Denies any periods of chest pain pressure, palpitations, or tachycardia. He is that the adjustment in the beta-blocker did not help with his symptoms.   Does report that he had EKG done in March and again last week which was within normal limits. Orthostatic blood pressures were assessed at today's visit and noted to be negative. Patient has not had any recent imaging of the brain. Patient had recent lab work done on 2020 including CBC, TSH, hemoglobin A1c, CMP, lipid panel, vitamin B12 level which were all noncontributory  He is alert and oriented x3, no acute distress, cooperative. No focal neurological deficits. Cerebellar testing unremarkable. Past Medical History:   Diagnosis Date    [facility]     Erectile dysfunction     Heart attack ([facility]     Hyperlipidemia     Hypertension     [facility]ocardial infarction) [facility]     Prostate cancer ([facility] 2011    Prostatectomy Dr. Waylon Moseley Type II or unspecified type diabetes mellitus without mention of complication, not stated as uncontrolled     Unspecified sleep apnea     CPAP Machine     Past Surgical History:   Procedure Laterality Date    CARDIAC CATHETERIZATION  2016    COLONOSCOPY  2009    Dr. Alba [place]      3 stents place post Heart Attack    EYE SURGERY Bilateral 2015 10/2015    cataract   [facility]      Dr. Lyly Frederick. Bob Miller History     Socioeconomic History    Marital status:      Spouse name: Not on file    Number of children: Not on file    Years of education: Not on file    Highest education level: Not on file   Occupational History    Not on file   Social Needs    Financial resource strain: Not on file    Food insecurity     Worry: Not on file     Inability: Not on file    Transportation needs     Medical: Not on file     Non-medical: Not on file   Tobacco Use    Smoking status: Former Smoker     Last attempt to quit: 1998     Years since quittin.2    Smokeless tobacco: Never Used   Substance and Sexual Activity    Alcohol use:  Yes     Alcohol/week: 2.0 standard drinks     Types: 1 Cans of beer, 1 Glasses of wine per week tablet Take 10 mg by mouth daily.  nitroGLYCERIN (NITROSTAT) 0.4 MG SL tablet Place 0.4 mg under the tongue every 5 minutes as needed. No current facility-administered medications on file prior to visit. Review of Systems   Constitutional: Negative for appetite change, chills, fatigue, fever and unexpected weight change. HENT: Negative for ear discharge, hearing loss, rhinorrhea, sinus pressure, sinus pain, tinnitus and trouble swallowing. Eyes: Negative for visual disturbance. Respiratory: Negative for cough, chest tightness, shortness of breath and wheezing. Cardiovascular: Negative for chest pain, palpitations and leg swelling. Gastrointestinal: Negative for abdominal distention, abdominal pain, constipation, diarrhea, nausea and vomiting. Genitourinary: Negative for difficulty urinating. Musculoskeletal: Negative for gait problem and neck stiffness. Skin: Negative for color change and rash. Neurological: Positive for dizziness. Negative for tremors, seizures, syncope, facial asymmetry, speech difficulty, weakness, light-headedness, numbness and headaches. Psychiatric/Behavioral: Negative for agitation, confusion and hallucinations. The patient is not nervous/anxious. Objective:   BP (!) 142/75 (Site: Right Upper Arm, Position: Sitting, Cuff Size: Large Adult)   Pulse 55     Physical Exam  Vitals signs reviewed. Constitutional:       General: He is not in acute distress. Appearance: He is obese. He is not ill-appearing or diaphoretic. HENT:      Head: Normocephalic and atraumatic. Eyes:      General: No visual field deficit. Extraocular Movements: Extraocular movements intact. Pupils: Pupils are equal, round, and reactive to light. Cardiovascular:      Rate and Rhythm: Regular rhythm. Comments: bradycardic    Pulmonary:      Effort: Pulmonary effort is normal. No respiratory distress. Breath sounds: Normal breath sounds. has multiple risk factors for CVD as well as history of cancer therefore MRI of the brain will need to be obtained to rule out any underlying pathology. Symptoms at this time seem most consistent with BPPV. He is nonfocal.  Will prescribe meclizine and send patient for vestibular rehab. Did discuss side effect of somnolence with meclizine and advised patient not to drive, operate heavy machinery, or do work that requires concentration until he knows how he would tolerate this medication.  - Patient had recent lab work done on 5/11/2020 including CBC, TSH, hemoglobin A1c, CMP, lipid panel, vitamin B12 level which were all reviewed and noncontributory  - Orthostatic blood pressures negative  - MRI BRAIN W WO CONTRAST; Future  - [facility] [place]  - meclizine (ANTIVERT) 25 MG tablet; Take 1 tablet by mouth 3 times daily as needed for Dizziness  Dispense: 30 tablet; Refill: 0  - US CAROTID ARTERY BILATERAL; Future  -Discussed need for risk factor modification to reduce risk of stroke. Cont asa/statin    2. Atrial fibrillation, unspecified type ([address]  - Controlled. Patient remains on Tikosyn and Eliquis. He is maintaining good follow-up with cardiology  - MRI BRAIN W WO CONTRAST; Future  - US CAROTID ARTERY BILATERAL; Future    3. Type 2 diabetes mellitus without complication, without long-term current use of insulin ([facility])  - Hemoglobin A1c 6.4 on 5/11/2020  - MRI BRAIN W WO CONTRAST; Future  - US CAROTID ARTERY BILATERAL; Future    4. Hypertension, unspecified type  - Controlled  - MRI BRAIN W WO CONTRAST; Future  - US CAROTID ARTERY BILATERAL; Future    5. Prostate cancer [facility])  - MRI BRAIN W WO CONTRAST; Future      Return in about 3 months (around 8/12/2020), or if symptoms worsen or fail to improve.     Melody Betancur, APRN - CNP

## 2020-05-20 ENCOUNTER — HOSPITAL ENCOUNTER (OUTPATIENT)
Dept: PHYSICAL THERAPY | Age: 70
Setting detail: THERAPIES SERIES
Discharge: HOME | End: 2020-05-20
Payer: MEDICARE

## 2020-05-20 PROCEDURE — 97161 PT EVAL LOW COMPLEX 20 MIN: CPT

## 2020-05-20 NOTE — PROGRESS NOTES
[address]     Ph: [phone]  Fax: [phone]    [x] Certification  [] Recertification []  Plan of Care  [] Progress Note [] Discharge      To: Anyi Nunn APRN-CNP      From:  Nic Weston, PT  Patient: Andrea Britton     : 1950  Diagnosis: Benign paroxysmal positional vertigo     Date: 2020  Treatment Diagnosis: BPPV       Progress Report Period from:  2020  to 2020    Total # of Visits to Date: 1   No Show: 0    Canceled Appointment: 0     OBJECTIVE:  Long Term Goals - Time Frame for Long term goals : 5 weeks  Goals Current/ Discharge status Met   Long term goal 1: Pt will report >/= 75% reduction in dizziness symptoms. Dizziness with changing position [] yes  [] no   Long term goal 2: (-) [place] Hallpike (+) Rt Hallpike [] yes  [] no       Body structures, Functions, Activity limitations: Vestibular Impairment  Assessment: Pt presents with ongoing dizziness that occurs with getting in and out of bed. Pt reports symptoms feel as if things are spinning and only last a few seconds. Pt with good strength, balance and gait as seen with coming into the clinic. Pt demonstrates smooth pursuits, saccades and VOR WNL with no reporte symptoms. Pt demonstrates nystagmus with Rt sit <> sidelying and reports some spinning sensation. Pt with (+) Rt Hallpike with nystagmus lasting <15 seconds suggesting Rt posterior canal BPPV. Pt with good tolerance for CRM with less reported symptoms during second CRM. Pt to f/u in 1 week to assess for further symptoms.   Prognosis: Good  Discharge Recommendations: Continue to assess pending progress      PT Education: Goals;PT Role;Plan of Care    PLAN: [x] Evaluate and Treat  Frequency/Duration:  Plan  Times per week: 1  Plan weeks: 5  Current Treatment Recommendations: Neuromuscular Re-education, Balance Training, Manual Therapy - Soft Tissue Mobilization, Home Exercise Program, Patient/Caregiver Education & Training, Equipment Evaluation, Education, & procurement, Modalities, Vestibular Rehab                Patient Status:[x] Continue/ Initiate plan of Care    [] Discharge PT. Recommend pt continue with HEP. [] Additional visits requested, Please re-certify for additional visits:          Signature: Electronically signed by Brayan Desai PT on 5/20/20 at 9:48 AM EDT      If you have any questions or concerns, please don't hesitate to call.   Thank you for your referral.

## 2020-05-20 NOTE — PROGRESS NOTES
few seconds. Pt with good strength, balance and gait as seen with coming into the clinic. Pt demonstrates smooth pursuits, saccades and VOR WNL with no reporte symptoms. Pt demonstrates nystagmus with Rt sit <> sidelying and reports some spinning sensation. Pt with (+) Rt Hallpike with nystagmus lasting <15 seconds suggesting Rt posterior canal BPPV. Pt with good tolerance for CRM with less reported symptoms during second CRM. Pt to f/u in 1 week to assess for further symptoms. Treatment Diagnosis: BPPV  Prognosis: Good  Decision Making: Low Complexity  History: PMH: MI x2, HTN, OA, DM, CAD, prostate ca  Exam: Dizziness with change in position with (+) Hallpike impacting QOL. Clinical Presentation: Stable  REQUIRES PT FOLLOW UP: Yes  Discharge Recommendations: Continue to assess pending progress    Patient Education:  PT Education: Goals, PT Role, Plan of Care  Pt verbalized/demonstrated good understanding:    [x] Yes         [] No, pt required further clarification. Plan:  Plan  Times per week: 1  Plan weeks: 5  Current Treatment Recommendations: Neuromuscular Re-education, Balance Training, Manual Therapy - Soft Tissue Mobilization, Home Exercise Program, Patient/Caregiver Education & Training, Equipment Evaluation, Education, & procurement, Modalities, Vestibular Rehab       Evaluation and patient rights have been reviewed and patient agrees with plan of care.   [x] Yes         [] No,    Signature: Electronically signed by Glenis Samaniego PT on 5/20/2020 at 9:48 AM    PT Individual Minutes  Time In: 0906  Time Out: 2606  Minutes: 22  Timed Code Treatment Minutes: 5 Minutes  Procedure Minutes:17'    Gregory Fall Risk Assessment  Risk Factor Scale  Score   History of Falls [] Yes  [x] No 25  0 0   Secondary Diagnosis [] Yes  [x] No 15  0 0   Ambulatory Aid [] Furniture  [] Crutches/cane/walker  [x] None/bedrest/wheelchair/nurse 30  15  0 0   IV/Heparin Lock [] Yes  [x] No 20  0 0   Gait/Transferring []

## 2020-05-27 ENCOUNTER — HOSPITAL ENCOUNTER (OUTPATIENT)
Dept: PHYSICAL THERAPY | Age: 70
Setting detail: THERAPIES SERIES
Discharge: HOME | End: 2020-05-27
Payer: MEDICARE

## 2020-05-27 PROCEDURE — 97112 NEUROMUSCULAR REEDUCATION: CPT

## 2020-06-13 ENCOUNTER — HOSPITAL ENCOUNTER (OUTPATIENT)
Dept: ULTRASOUND IMAGING | Age: 70
Discharge: HOME | End: 2020-06-15
Payer: MEDICARE

## 2020-06-13 PROCEDURE — 93880 EXTRACRANIAL BILAT STUDY: CPT

## 2020-06-16 ENCOUNTER — TELEPHONE (OUTPATIENT)
Dept: NEUROLOGY | Age: 70
End: 2020-06-16

## 2020-07-01 ENCOUNTER — HOSPITAL ENCOUNTER (OUTPATIENT)
Dept: MRI IMAGING | Age: 70
Discharge: HOME | End: 2020-07-03
Payer: MEDICARE

## 2020-07-01 PROCEDURE — 6360000004 HC RX CONTRAST MEDICATION: Performed by: NURSE PRACTITIONER

## 2020-07-01 PROCEDURE — A9577 INJ MULTIHANCE: HCPCS | Performed by: NURSE PRACTITIONER

## 2020-07-01 PROCEDURE — 70553 MRI BRAIN STEM W/O & W/DYE: CPT

## 2020-07-01 RX ORDER — SODIUM CHLORIDE 9 MG/ML
10 INJECTION INTRAVENOUS ONCE
Status: DISCONTINUED | OUTPATIENT
Start: 2020-07-01 | End: 2020-07-04 | Stop reason: HOSPADM

## 2020-07-01 RX ADMIN — GADOBENATE DIMEGLUMINE 20 ML: 529 INJECTION, SOLUTION INTRAVENOUS at 11:03

## 2020-07-06 ENCOUNTER — CLINICAL DOCUMENTATION (OUTPATIENT)
Dept: PHYSICAL THERAPY | Age: 70
End: 2020-07-06

## 2020-07-07 ENCOUNTER — TELEPHONE (OUTPATIENT)
Dept: NEUROLOGY | Age: 70
End: 2020-07-07

## 2020-07-07 NOTE — TELEPHONE ENCOUNTER
Reviewed results of MRI brain. No acute findings. S VID noted. Patient called and updated on results and no new orders at this time. He was educated on risk factor modification and medication compliance with aspirin, statin and oral anticoagulation to help reduce risk of stroke in the future.

## 2020-08-11 ENCOUNTER — OFFICE VISIT (OUTPATIENT)
Dept: NEUROLOGY | Age: 70
End: 2020-08-11
Payer: MEDICARE

## 2020-08-11 VITALS
WEIGHT: 272 LBS | BODY MASS INDEX: 35.89 KG/M2 | SYSTOLIC BLOOD PRESSURE: 116 MMHG | HEART RATE: 57 BPM | DIASTOLIC BLOOD PRESSURE: 64 MMHG

## 2020-08-11 PROCEDURE — 3017F COLORECTAL CA SCREEN DOC REV: CPT | Performed by: NURSE PRACTITIONER

## 2020-08-11 PROCEDURE — G8427 DOCREV CUR MEDS BY ELIG CLIN: HCPCS | Performed by: NURSE PRACTITIONER

## 2020-08-11 PROCEDURE — G8417 CALC BMI ABV UP PARAM F/U: HCPCS | Performed by: NURSE PRACTITIONER

## 2020-08-11 PROCEDURE — 1036F TOBACCO NON-USER: CPT | Performed by: NURSE PRACTITIONER

## 2020-08-11 PROCEDURE — 1123F ACP DISCUSS/DSCN MKR DOCD: CPT | Performed by: NURSE PRACTITIONER

## 2020-08-11 PROCEDURE — 99213 OFFICE O/P EST LOW 20 MIN: CPT | Performed by: NURSE PRACTITIONER

## 2020-08-11 PROCEDURE — 4040F PNEUMOC VAC/ADMIN/RCVD: CPT | Performed by: NURSE PRACTITIONER

## 2020-08-11 RX ORDER — RANOLAZINE 500 MG/1
TABLET, EXTENDED RELEASE ORAL
COMMUNITY
Start: 2020-07-10

## 2020-08-11 ASSESSMENT — ENCOUNTER SYMPTOMS
SHORTNESS OF BREATH: 0
TROUBLE SWALLOWING: 0
CHEST TIGHTNESS: 0
COLOR CHANGE: 0
COUGH: 0
WHEEZING: 0

## 2020-08-11 NOTE — PROGRESS NOTES
 Food insecurity     Worry: Not on file     Inability: Not on file    Transportation needs     Medical: Not on file     Non-medical: Not on file   Tobacco Use    Smoking status: Former Smoker     Last attempt to quit: 1998     Years since quittin.5    Smokeless tobacco: Never Used   Substance and Sexual Activity    Alcohol use:  Yes     Alcohol/week: 2.0 standard drinks     Types: 1 Cans of beer, 1 Glasses of wine per week     Comment: social    Drug use: No    Sexual activity: Not on file   Lifestyle    Physical activity     Days per week: Not on file     Minutes per session: Not on file    Stress: Not on file   Relationships    Social connections     Talks on phone: Not on file     Gets together: Not on file     Attends Gnosticism service: Not on file     Active member of club or organization: Not on file     Attends meetings of clubs or organizations: Not on file     Relationship status: Not on file    Intimate partner violence     Fear of current or ex partner: Not on file     Emotionally abused: Not on file     Physically abused: Not on file     Forced sexual activity: Not on file   Other Topics Concern    Not on file   Social History Narrative    Not on file     Family History   Problem Relation Age of Onset    Stroke Mother      No Known Allergies  Current Outpatient Medications on File Prior to Visit   Medication Sig Dispense Refill    ranolazine (RANEXA) 500 MG extended release tablet take 1 tablet by mouth twice a day      FREESTYLE LITE strip TEST BLOOD SUGAR ONCE DAILY      dofetilide (TIKOSYN) 125 MCG capsule Take 125 mcg by mouth 2 times daily      dofetilide (TIKOSYN) 250 MCG capsule Take 250 mcg by mouth 2 times daily      empagliflozin (JARDIANCE) 10 MG tablet Take 10 mg by mouth daily      apixaban (ELIQUIS) 5 MG TABS tablet Take 1 tablet by mouth 2 times daily 60 tablet 3    metoprolol succinate (TOPROL XL) 25 MG extended release tablet Take 1 tablet by mouth daily 30 tablet 3    vitamin B-12 (CYANOCOBALAMIN) 100 MCG tablet Take 50 mcg by mouth daily      aspirin 81 MG tablet Take 81 mg by mouth 2 times daily.  Calcium Carbonate-Vit D-Min (CALCIUM 1200 PO) Take  by mouth daily.  CPAP Machine MISC by Does not apply route.  levothyroxine (SYNTHROID) 50 MCG tablet take 1 tablet by mouth once daily 90 tablet 3    ramipril (ALTACE) 10 MG tablet Take 10 mg by mouth daily.  metformin (GLUCOPHAGE) 500 MG tablet Take 500 mg by mouth Daily.  rosuvastatin (CRESTOR) 10 MG tablet Take 10 mg by mouth daily.  nitroGLYCERIN (NITROSTAT) 0.4 MG SL tablet Place 0.4 mg under the tongue every 5 minutes as needed. No current facility-administered medications on file prior to visit. Review of Systems   Constitutional: Negative for appetite change, chills, fatigue and fever. HENT: Negative for hearing loss and trouble swallowing. Eyes: Negative for visual disturbance. Respiratory: Negative for cough, chest tightness, shortness of breath and wheezing. Cardiovascular: Negative for chest pain, palpitations and leg swelling. Genitourinary: Negative for difficulty urinating. Musculoskeletal: Negative for gait problem. Skin: Negative for color change and rash. Neurological: Negative for dizziness, tremors, seizures, syncope, facial asymmetry, speech difficulty, weakness, light-headedness, numbness and headaches. Psychiatric/Behavioral: Negative for agitation, confusion and hallucinations. The patient is not nervous/anxious. Objective:   /64 (Site: Right Upper Arm, Position: Sitting, Cuff Size: Large Adult)   Pulse 57   Wt 272 lb (123.4 kg)   BMI 35.89 kg/m²     Physical Exam  Vitals signs reviewed. Constitutional:       General: He is not in acute distress. Appearance: He is obese. He is not diaphoretic. HENT:      Head: Normocephalic and atraumatic.    Eyes:      Extraocular Movements: Extraocular movements intact. Pupils: Pupils are equal, round, and reactive to light. Cardiovascular:      Rate and Rhythm: Normal rate and regular rhythm. Pulmonary:      Effort: Pulmonary effort is normal. No respiratory distress. Breath sounds: Normal breath sounds. Abdominal:      General: Bowel sounds are normal. There is no distension. Palpations: Abdomen is soft. Tenderness: There is no abdominal tenderness. Skin:     General: Skin is warm and dry. Neurological:      General: No focal deficit present. Mental Status: He is alert and oriented to person, place, and time. Mental status is at baseline. Cranial Nerves: No cranial nerve deficit. Mri Brain W Wo Contrast    Result Date: 7/1/2020  EXAM: MRI of the brain without and with contrast History: Benign paroxysmal positional vertigo. Imbalance. Technique: Multiplanar multisequence MRI of the brain was performed without and with contrast including pre and postcontrast axial and coronal T1 sequences and post processed 3-D images of the inner ears. Comparison: MRI of the brain from November 13, 2006 Findings: No progression of multiple small foci of hyperintensity/FLAIR signal within the bilateral supratentorial white matter that are nonspecific but most compatible with chronic small vessel ischemic changes in a patient of this age. Prominence of the sulci and ventricles compatible with mild generalized parenchymal volume loss. No acute hemorrhage, mass, mass effect, midline shift, or abnormal extra-axial fluid collection. Midline structures are within normal limits. The posterior fossa is within normal limits. There is no diffusion restriction. No susceptibility artifact is identified on the gradient echo sequence. The major intracranial vascular flow voids are maintained. Cranial nerves VII and VIII are normal in course and contour. No enhancing mass is present in the cerebellopontine angles or internal auditory canals.  The cochlea and vestibules demonstrate normal fluid signal. No evidence of superior semicircular canal dehiscence. The visualized paranasal sinuses are clear. Trace fluid within the right mastoid air cells without enhancement. No abnormal signal or enhancement of cranial nerves VII or VIII. No internal auditory canal mass or cerebellopontine angle mass. Mild interval progression of nonspecific white matter findings most compatible with chronic small vessel ischemic changes in a patient of this age. Mri Brain W Wo Contrast    Result Date: 7/1/2020  EXAM: MRI of the brain without and with contrast History: Benign paroxysmal positional vertigo. Imbalance. Technique: Multiplanar multisequence MRI of the brain was performed without and with contrast including pre and postcontrast axial and coronal T1 sequences and post processed 3-D images of the inner ears. Comparison: MRI of the brain from November 13, 2006 Findings: No progression of multiple small foci of hyperintensity/FLAIR signal within the bilateral supratentorial white matter that are nonspecific but most compatible with chronic small vessel ischemic changes in a patient of this age. Prominence of the sulci and ventricles compatible with mild generalized parenchymal volume loss. No acute hemorrhage, mass, mass effect, midline shift, or abnormal extra-axial fluid collection. Midline structures are within normal limits. The posterior fossa is within normal limits. There is no diffusion restriction. No susceptibility artifact is identified on the gradient echo sequence. The major intracranial vascular flow voids are maintained. Cranial nerves VII and VIII are normal in course and contour. No enhancing mass is present in the cerebellopontine angles or internal auditory canals. The cochlea and vestibules demonstrate normal fluid signal. No evidence of superior semicircular canal dehiscence. The visualized paranasal sinuses are clear.  Trace fluid within the right mastoid symptoms worsen or fail to improve.     Kell Dorman, APRN - CNP

## 2021-01-22 DIAGNOSIS — Z85.46 H/O PROSTATE CANCER: ICD-10-CM

## 2021-01-22 LAB — PROSTATE SPECIFIC ANTIGEN: 0.07 NG/ML (ref 0–6.22)

## 2021-01-29 ENCOUNTER — OFFICE VISIT (OUTPATIENT)
Dept: UROLOGY | Age: 71
End: 2021-01-29
Payer: MEDICARE

## 2021-01-29 VITALS
SYSTOLIC BLOOD PRESSURE: 136 MMHG | BODY MASS INDEX: 36.45 KG/M2 | HEART RATE: 52 BPM | WEIGHT: 275 LBS | DIASTOLIC BLOOD PRESSURE: 82 MMHG | HEIGHT: 73 IN

## 2021-01-29 DIAGNOSIS — Z85.46 H/O PROSTATE CANCER: Primary | ICD-10-CM

## 2021-01-29 PROCEDURE — 1123F ACP DISCUSS/DSCN MKR DOCD: CPT | Performed by: UROLOGY

## 2021-01-29 PROCEDURE — 1036F TOBACCO NON-USER: CPT | Performed by: UROLOGY

## 2021-01-29 PROCEDURE — G8484 FLU IMMUNIZE NO ADMIN: HCPCS | Performed by: UROLOGY

## 2021-01-29 PROCEDURE — 99213 OFFICE O/P EST LOW 20 MIN: CPT | Performed by: UROLOGY

## 2021-01-29 PROCEDURE — G8417 CALC BMI ABV UP PARAM F/U: HCPCS | Performed by: UROLOGY

## 2021-01-29 PROCEDURE — 4040F PNEUMOC VAC/ADMIN/RCVD: CPT | Performed by: UROLOGY

## 2021-01-29 PROCEDURE — G8427 DOCREV CUR MEDS BY ELIG CLIN: HCPCS | Performed by: UROLOGY

## 2021-01-29 PROCEDURE — 3017F COLORECTAL CA SCREEN DOC REV: CPT | Performed by: UROLOGY

## 2021-01-29 ASSESSMENT — ENCOUNTER SYMPTOMS
ABDOMINAL PAIN: 0
SHORTNESS OF BREATH: 0
ABDOMINAL DISTENTION: 0

## 2021-01-29 NOTE — PROGRESS NOTES
Subjective:      Patient ID: New Veloz is a 69 y.o. male. HPI This is a 68 yo white male with h/o HTN, CAD/Stents, AFib on Eliquis/Tikosyn, and Sara 7 prostate cancer s/p RALP (rt nerve sparing/angeles LND) on 12. Since last seen on 20, he has no frequency or urgency and feels the bladder empties. He denies RAYMUNDO. He has no hematuria or dysuria. He has no abd or flank pain and no wt loss or abnl bone pains.  He has no interval medical or surgical problems except he sustained non-displaced ankle fracture last year.  I reviewed his interval PSA today.      PATH: LN: neg, Sara 4+3 = 7 angeles with PN invasion, margins neg, capsule neg, sv neg       Past Medical History:   Diagnosis Date    Atrial [facility] ([facility]     Erectile dysfunction     Heart attack ([facility] [address] 2018    Hyperlipidemia     Hypertension     MI [facility]myocardial infarction) ([facility] [facility]     Prostate cancer ([facility] [address] 2011    Prostatectomy Dr. Isha Nolan    Type II or unspecified type diabetes mellitus without mention of complication, not stated as uncontrolled     Unspecified sleep apnea     CPAP Machine     Past Surgical History:   Procedure Laterality Date    CARDIAC CATHETERIZATION  2016    COLONOSCOPY  2009    Dr. Yaakov Logan  2001    3 stents place post Heart Attack    EYE SURGERY Bilateral 2015 10/2015    cataract   Laurena Habermann      Dr. Marcelo Knight. Cecilia Chun History     Socioeconomic History    Marital status:      Spouse name: None    Number of children: None    Years of education: None    Highest education level: None   Occupational History    None   Social Needs    Financial resource strain: None    Food insecurity     Worry: None     Inability: None    Transportation needs     Medical: None     Non-medical: None   Tobacco Use    Smoking status: Former Smoker     Quit date: 1998     Years since quittin.0    Smokeless tobacco: Never Used Substance and Sexual Activity    Alcohol use: Yes     Alcohol/week: 2.0 standard drinks     Types: 1 Cans of beer, 1 Glasses of wine per week     Comment: social    Drug use: No    Sexual activity: None   Lifestyle    Physical activity     Days per week: None     Minutes per session: None    Stress: None   Relationships    Social connections     Talks on phone: None     Gets together: None     Attends Denominational service: None     Active member of club or organization: None     Attends meetings of clubs or organizations: None     Relationship status: None    Intimate partner violence     Fear of current or ex partner: None     Emotionally abused: None     Physically abused: None     Forced sexual activity: None   Other Topics Concern    None   Social History Narrative    None     Family History   Problem Relation Age of Onset    Stroke Mother      Current Outpatient Medications   Medication Sig Dispense Refill    ranolazine (RANEXA) 500 MG extended release tablet take 1 tablet by mouth twice a day      FREESTYLE LITE strip TEST BLOOD SUGAR ONCE DAILY      dofetilide (TIKOSYN) 125 MCG capsule Take 125 mcg by mouth 2 times daily      dofetilide (TIKOSYN) 250 MCG capsule Take 250 mcg by mouth 2 times daily      empagliflozin (JARDIANCE) 10 MG tablet Take 10 mg by mouth daily      apixaban (ELIQUIS) 5 MG TABS tablet Take 1 tablet by mouth 2 times daily 60 tablet 3    metoprolol succinate (TOPROL XL) 25 MG extended release tablet Take 1 tablet by mouth daily 30 tablet 3    vitamin B-12 (CYANOCOBALAMIN) 100 MCG tablet Take 50 mcg by mouth daily      aspirin 81 MG tablet Take 81 mg by mouth 2 times daily.  Calcium Carbonate-Vit D-Min (CALCIUM 1200 PO) Take  by mouth daily.  CPAP Machine MISC by Does not apply route.  levothyroxine (SYNTHROID) 50 MCG tablet take 1 tablet by mouth once daily 90 tablet 3    ramipril (ALTACE) 10 MG tablet Take 10 mg by mouth daily.  metformin (GLUCOPHAGE) 500 MG tablet Take 500 mg by mouth Daily.  rosuvastatin (CRESTOR) 10 MG tablet Take 10 mg by mouth daily.  nitroGLYCERIN (NITROSTAT) 0.4 MG SL tablet Place 0.4 mg under the tongue every 5 minutes as needed. No current facility-administered medications for this visit. Patient has no known allergies. reviewed    Review of Systems   Constitutional: Negative for unexpected weight change. Respiratory: Negative for shortness of breath. Cardiovascular: Negative for chest pain. Gastrointestinal: Negative for abdominal distention and abdominal pain. Genitourinary: Negative for decreased urine volume, difficulty urinating, flank pain and hematuria. Objective:   Physical Exam  Constitutional:       Appearance: Normal appearance. Abdominal:      Palpations: Abdomen is soft. Tenderness: There is no abdominal tenderness. There is no guarding. Neurological:      Mental Status: He is alert.    Psychiatric:         Mood and Affect: Mood normal.           PSA   Date Value Ref Range Status   01/22/2021 0.07 0.00 - 6.22 ng/mL Final   01/14/2020 0.06 0.00 - 6.22 ng/mL Final   01/18/2019 0.05 0.00 - 6.22 ng/mL Final   12/28/2017 0.03 0.00 - 5.40 ng/mL Final   12/29/2016 0.03 0.00 - 5.40 ng/mL Final     Diagnostic Psa   Date Value Ref Range Status   11/28/2014 0.02 0.00 - 5.40 ng/mL Final   05/16/2014 0.02 0.00 - 5.40 ng/mL Final   11/14/2013 0.0 0.0 - 4.0 ng/mL Final   08/02/2013 0.0 0.0 - 4.0 ng/mL Final       Assessment: This is a 68 yo white male with h/o HTN, CAD/Stents, AFib on Eliquis/Tikosyn, and [place] 7 prostate cancer s/p RALP (rt nerve sparing/angeles LND) with favorable pathologic features and a slow rise in the recent PSA's. I again discussed this may be more related to use of ultra-sensitive PSA vs a concern for biochemical recurrence viky given prior PSA's have risen and decreased in 2016. I again explained there is uncertainty and it could represent biochemical failure and could consider Rad Oncology evaluation in the future vs ADT. For now, he just wants to continue with yearly PSA follow-up which is reasonable given his age and co-morbidities. Plan:      1.  F/U 1 yr for PSA        Jonathan Gibson MD

## 2021-10-04 LAB
ANION GAP SERPL CALCULATED.3IONS-SCNC: 8 MEQ/L (ref 9–15)
BUN BLDV-MCNC: 18 MG/DL (ref 8–23)
CALCIUM SERPL-MCNC: 9.4 MG/DL (ref 8.5–9.9)
CHLORIDE BLD-SCNC: 105 MEQ/L (ref 95–107)
CO2: 26 MEQ/L (ref 20–31)
CREAT SERPL-MCNC: 0.98 MG/DL (ref 0.7–1.2)
GFR AFRICAN AMERICAN: >60
GFR NON-AFRICAN AMERICAN: >60
GLUCOSE BLD-MCNC: 122 MG/DL (ref 70–99)
HCT VFR BLD CALC: 48.4 % (ref 42–52)
HEMOGLOBIN: 16.4 G/DL (ref 14–18)
MCH RBC QN AUTO: 33 PG (ref 27–31.3)
MCHC RBC AUTO-ENTMCNC: 33.8 % (ref 33–37)
MCV RBC AUTO: 97.5 FL (ref 80–100)
PDW BLD-RTO: 12.9 % (ref 11.5–14.5)
PLATELET # BLD: 154 K/UL (ref 130–400)
POTASSIUM SERPL-SCNC: 5 MEQ/L (ref 3.4–4.9)
RBC # BLD: 4.97 M/UL (ref 4.7–6.1)
SODIUM BLD-SCNC: 139 MEQ/L (ref 135–144)
WBC # BLD: 5 K/UL (ref 4.8–10.8)

## 2022-01-31 DIAGNOSIS — Z85.46 H/O PROSTATE CANCER: ICD-10-CM

## 2022-01-31 DIAGNOSIS — Z85.46 H/O PROSTATE CANCER: Primary | ICD-10-CM

## 2022-01-31 LAB — PROSTATE SPECIFIC ANTIGEN: 0.06 NG/ML (ref 0–4)

## 2022-02-04 ENCOUNTER — OFFICE VISIT (OUTPATIENT)
Dept: UROLOGY | Age: 72
End: 2022-02-04
Payer: MEDICARE

## 2022-02-04 VITALS
BODY MASS INDEX: 36.45 KG/M2 | SYSTOLIC BLOOD PRESSURE: 124 MMHG | DIASTOLIC BLOOD PRESSURE: 68 MMHG | HEIGHT: 73 IN | HEART RATE: 64 BPM | WEIGHT: 275 LBS | OXYGEN SATURATION: 98 %

## 2022-02-04 DIAGNOSIS — Z85.46 H/O PROSTATE CANCER: Primary | ICD-10-CM

## 2022-02-04 PROCEDURE — G8484 FLU IMMUNIZE NO ADMIN: HCPCS | Performed by: UROLOGY

## 2022-02-04 PROCEDURE — 1123F ACP DISCUSS/DSCN MKR DOCD: CPT | Performed by: UROLOGY

## 2022-02-04 PROCEDURE — G8417 CALC BMI ABV UP PARAM F/U: HCPCS | Performed by: UROLOGY

## 2022-02-04 PROCEDURE — 4040F PNEUMOC VAC/ADMIN/RCVD: CPT | Performed by: UROLOGY

## 2022-02-04 PROCEDURE — 3017F COLORECTAL CA SCREEN DOC REV: CPT | Performed by: UROLOGY

## 2022-02-04 PROCEDURE — G8427 DOCREV CUR MEDS BY ELIG CLIN: HCPCS | Performed by: UROLOGY

## 2022-02-04 PROCEDURE — 1036F TOBACCO NON-USER: CPT | Performed by: UROLOGY

## 2022-02-04 PROCEDURE — 99213 OFFICE O/P EST LOW 20 MIN: CPT | Performed by: UROLOGY

## 2022-02-04 ASSESSMENT — ENCOUNTER SYMPTOMS
ABDOMINAL PAIN: 0
ABDOMINAL DISTENTION: 0

## 2022-02-04 NOTE — PROGRESS NOTES
Subjective:      Patient ID: Raul Gonzalez is a 67 y.o. male    HPI  This is a 68 yo white male with h/o HTN, CAD/Stents, AFib on Eliquis/Tikosyn, and [place] 7 prostate cancer s/p RALP (rt nerve sparing/angeles LND) on 12. Since last seen on 21, he has no RAYMUNDO, frequency or urgency and feels the bladder empties. He has no hematuria or dysuria. He has no abd or flank pain and no wt loss or abnl bone pains. He has no interval medical or surgical problems.   I reviewed his interval PSA today.      PATH: LN: neg, Sara 4+3 = 7 angeles with PN invasion, margins neg, capsule neg, sv neg     Past Medical History:   Diagnosis Date    Atrial fibrillation ([address]     Erectile dysfunction     Heart attack ([facility] [address] 2018    Hyperlipidemia     Hypertension     MI (myocardial infarction) ([facility] [facility]     Prostate cancer ([facility] [address] 2011    Prostatectomy Dr. Nagel Feeling    Type II or unspecified type diabetes mellitus without mention of complication, not stated as uncontrolled     Unspecified sleep apnea     CPAP Machine     Past Surgical History:   Procedure Laterality Date    CARDIAC CATHETERIZATION  2016    COLONOSCOPY  2009    Dr. Imelda Peña      3 stents place post Heart Attack    EYE SURGERY Bilateral 2015 10/2015    cataract   Veto Herrera      Dr. Janeen Cleveland. Oxana Leyva History     Socioeconomic History    Marital status:      Spouse name: None    Number of children: None    Years of education: None    Highest education level: None   Occupational History    None   Tobacco Use    Smoking status: Former Smoker     Quit date: 1998     Years since quittin.0    Smokeless tobacco: Never Used   Vaping Use    Vaping Use: Never used   Substance and Sexual Activity    Alcohol use:  Yes     Alcohol/week: 2.0 standard drinks     Types: 1 Cans of beer, 1 Glasses of wine per week     Comment: social    Drug use: No    Sexual activity: None Other Topics Concern    None   Social History Narrative    None     Social Determinants of Health     Financial Resource Strain:     Difficulty of Paying Living Expenses: Not on file   Food Insecurity:     Worried About Running Out of Food in the Last Year: Not on file    Debo of Food in the Last Year: Not on file   Transportation Needs:     Lack of Transportation (Medical): Not on file    Lack of Transportation (Non-Medical):  Not on file   Physical Activity:     Days of Exercise per Week: Not on file    Minutes of Exercise per Session: Not on file   Stress:     Feeling of Stress : Not on file   Social Connections:     Frequency of Communication with Friends and Family: Not on file    Frequency of Social Gatherings with Friends and Family: Not on file    Attends Jainism Services: Not on file    Active Member of [address] [place] [address] [employer] or Organizations: Not on file    Attends Club or Organization Meetings: Not on file    Marital Status: Not on file   Intimate Partner Violence:     Fear of Current or Ex-Partner: Not on file    Emotionally Abused: Not on file    Physically Abused: Not on file    Sexually Abused: Not on file   Housing Stability:     Unable to Pay for Housing in the Last Year: Not on file    Number of [place] in the Last Year: Not on file    Unstable Housing in the Last Year: Not on file     Family History   Problem Relation Age of Onset    Stroke Mother      Current Outpatient Medications   Medication Sig Dispense Refill    ranolazine (RANEXA) 500 MG extended release tablet take 1 tablet by mouth twice a day      FREESTYLE LITE strip TEST BLOOD SUGAR ONCE DAILY      dofetilide (TIKOSYN) 125 MCG capsule Take 125 mcg by mouth 2 times daily      dofetilide (TIKOSYN) 250 MCG capsule Take 250 mcg by mouth 2 times daily      empagliflozin (JARDIANCE) 10 MG tablet Take 10 mg by mouth daily      apixaban (ELIQUIS) 5 MG TABS tablet Take 1 tablet by mouth 2 times daily 60 tablet 3    metoprolol succinate (TOPROL XL) 25 MG extended release tablet Take 1 tablet by mouth daily 30 tablet 3    vitamin B-12 (CYANOCOBALAMIN) 100 MCG tablet Take 50 mcg by mouth daily      aspirin 81 MG tablet Take 81 mg by mouth 2 times daily.  Calcium Carbonate-Vit D-Min (CALCIUM 1200 PO) Take  by mouth daily.  CPAP Machine MISC by Does not apply route.  levothyroxine (SYNTHROID) 50 MCG tablet take 1 tablet by mouth once daily 90 tablet 3    ramipril (ALTACE) 10 MG tablet Take 10 mg by mouth daily.  metformin (GLUCOPHAGE) 500 MG tablet Take 500 mg by mouth Daily.  rosuvastatin (CRESTOR) 10 MG tablet Take 10 mg by mouth daily.  nitroGLYCERIN (NITROSTAT) 0.4 MG SL tablet Place 0.4 mg under the tongue every 5 minutes as needed. (Patient not taking: Reported on 2/4/2022)       No current facility-administered medications for this visit. Patient has no known allergies. reviewed      Review of Systems   Constitutional: Negative for unexpected weight change. Gastrointestinal: Negative for abdominal distention and abdominal pain. Genitourinary: Negative for dysuria, flank pain and hematuria. Objective:   Physical Exam  Constitutional:       Appearance: Normal appearance. Abdominal:      Palpations: Abdomen is soft. Tenderness: There is no abdominal tenderness. Neurological:      Mental Status: He is alert. Psychiatric:         Mood and Affect: Mood normal.            PSA   Date Value Ref Range Status   01/31/2022 0.06 0.00 - 4.00 ng/mL Final   01/22/2021 0.07 0.00 - 6.22 ng/mL Final   01/14/2020 0.06 0.00 - 6.22 ng/mL Final   01/18/2019 0.05 0.00 - 6.22 ng/mL Final   12/28/2017 0.03 0.00 - 5.40 ng/mL Final     Diagnostic Psa   Date Value Ref Range Status   11/28/2014 0.02 0.00 - 5.40 ng/mL Final   05/16/2014 0.02 0.00 - 5.40 ng/mL Final   11/14/2013 0.0 0.0 - 4.0 ng/mL Final   08/02/2013 0.0 0.0 - 4.0 ng/mL Final       Assessment:       This is W 86 QL white male with h/o HTN, CAD/Stents, AFib on Eliquis/Tikosyn, and [place] 7 prostate cancer s/p RALP (rt nerve sparing/angeles LND) with favorable pathologic features and a low and improved interval PSA. I reassured him of these findings and recommend he continue with yearly PSA follow-up. Plan:      1.  F/U 1 yr for PSA        Robert Ledesma MD

## 2022-12-29 ENCOUNTER — APPOINTMENT (OUTPATIENT)
Dept: CT IMAGING | Age: 72
End: 2022-12-29
Payer: MEDICARE

## 2022-12-29 ENCOUNTER — HOSPITAL ENCOUNTER (EMERGENCY)
Age: 72
Discharge: HOME | End: 2022-12-29
Attending: EMERGENCY MEDICINE
Payer: MEDICARE

## 2022-12-29 VITALS
OXYGEN SATURATION: 96 % | RESPIRATION RATE: 20 BRPM | HEART RATE: 63 BPM | DIASTOLIC BLOOD PRESSURE: 59 MMHG | TEMPERATURE: 97 F | SYSTOLIC BLOOD PRESSURE: 93 MMHG

## 2022-12-29 DIAGNOSIS — K62.5 RECTAL BLEEDING: Primary | ICD-10-CM

## 2022-12-29 LAB
ABO/RH: NORMAL
ALBUMIN SERPL-MCNC: 4.4 G/DL (ref 3.5–4.6)
ALP BLD-CCNC: 80 U/L (ref 35–104)
ALT SERPL-CCNC: 30 U/L (ref 0–41)
ANION GAP SERPL CALCULATED.3IONS-SCNC: 13 MEQ/L (ref 9–15)
ANTIBODY SCREEN: NORMAL
AST SERPL-CCNC: 23 U/L (ref 0–40)
BASOPHILS ABSOLUTE: 0 K/UL (ref 0–0.2)
BASOPHILS RELATIVE PERCENT: 0.3 %
BILIRUB SERPL-MCNC: 0.5 MG/DL (ref 0.2–0.7)
BUN BLDV-MCNC: 22 MG/DL (ref 8–23)
CALCIUM SERPL-MCNC: 9.8 MG/DL (ref 8.5–9.9)
CHLORIDE BLD-SCNC: 98 MEQ/L (ref 95–107)
CO2: 24 MEQ/L (ref 20–31)
CREAT SERPL-MCNC: 0.96 MG/DL (ref 0.7–1.2)
EOSINOPHILS ABSOLUTE: 0.1 K/UL (ref 0–0.7)
EOSINOPHILS RELATIVE PERCENT: 1 %
GFR SERPL CREATININE-BSD FRML MDRD: >60 ML/MIN/{1.73_M2}
GLOBULIN: 2.6 G/DL (ref 2.3–3.5)
GLUCOSE BLD-MCNC: 126 MG/DL (ref 70–99)
HCT VFR BLD CALC: 47.7 % (ref 42–52)
HEMOGLOBIN: 15.9 G/DL (ref 14–18)
LYMPHOCYTES ABSOLUTE: 0.9 K/UL (ref 1–4.8)
LYMPHOCYTES RELATIVE PERCENT: 12.7 %
MCH RBC QN AUTO: 33.1 PG (ref 27–31.3)
MCHC RBC AUTO-ENTMCNC: 33.4 % (ref 33–37)
MCV RBC AUTO: 99.2 FL (ref 79–92.2)
MONOCYTES ABSOLUTE: 0.7 K/UL (ref 0.2–0.8)
MONOCYTES RELATIVE PERCENT: 9.7 %
NEUTROPHILS ABSOLUTE: 5.7 K/UL (ref 1.4–6.5)
NEUTROPHILS RELATIVE PERCENT: 76.3 %
PDW BLD-RTO: 13.3 % (ref 11.5–14.5)
PLATELET # BLD: 160 K/UL (ref 130–400)
PLATELET SLIDE REVIEW: NORMAL
POC CREATININE WHOLE BLOOD: 1.1
POTASSIUM SERPL-SCNC: 4.3 MEQ/L (ref 3.4–4.9)
RBC # BLD: 4.81 M/UL (ref 4.7–6.1)
RBC # BLD: NORMAL 10*6/UL
SLIDE REVIEW: ABNORMAL
SODIUM BLD-SCNC: 135 MEQ/L (ref 135–144)
TOTAL PROTEIN: 7 G/DL (ref 6.3–8)
WBC # BLD: 7.4 K/UL (ref 4.8–10.8)

## 2022-12-29 PROCEDURE — 6360000004 HC RX CONTRAST MEDICATION: Performed by: EMERGENCY MEDICINE

## 2022-12-29 PROCEDURE — 36415 COLL VENOUS BLD VENIPUNCTURE: CPT

## 2022-12-29 PROCEDURE — 86901 BLOOD TYPING SEROLOGIC RH(D): CPT

## 2022-12-29 PROCEDURE — 99285 EMERGENCY DEPT VISIT HI MDM: CPT

## 2022-12-29 PROCEDURE — 74177 CT ABD & PELVIS W/CONTRAST: CPT

## 2022-12-29 PROCEDURE — 86850 RBC ANTIBODY SCREEN: CPT

## 2022-12-29 PROCEDURE — 85025 COMPLETE CBC W/AUTO DIFF WBC: CPT

## 2022-12-29 PROCEDURE — 86900 BLOOD TYPING SEROLOGIC ABO: CPT

## 2022-12-29 PROCEDURE — 80053 COMPREHEN METABOLIC PANEL: CPT

## 2022-12-29 RX ORDER — HYDROCORTISONE ACETATE 25 MG/1
25 SUPPOSITORY RECTAL 2 TIMES DAILY
Qty: 12 SUPPOSITORY | Refills: 0 | Status: SHIPPED | OUTPATIENT
Start: 2022-12-29

## 2022-12-29 RX ADMIN — IOPAMIDOL 50 ML: 612 INJECTION, SOLUTION INTRAVENOUS at 16:31

## 2022-12-29 ASSESSMENT — PAIN - FUNCTIONAL ASSESSMENT: PAIN_FUNCTIONAL_ASSESSMENT: NONE - DENIES PAIN

## 2022-12-29 ASSESSMENT — ENCOUNTER SYMPTOMS
RECTAL PAIN: 1
VOMITING: 0
DIARRHEA: 0
EYE DISCHARGE: 0
COUGH: 0
ABDOMINAL DISTENTION: 0
ABDOMINAL PAIN: 0
PHOTOPHOBIA: 0
WHEEZING: 0
SORE THROAT: 0
ANAL BLEEDING: 1
CHEST TIGHTNESS: 0
NAUSEA: 0
SHORTNESS OF BREATH: 0

## 2022-12-29 NOTE — ED PROVIDER NOTES
[facility] [facility] ED  eMERGENCY dEPARTMENT eNCOUnter      Pt Name: Jono Olivier  MRN: [id number]  [place] 1950  Date of evaluation: 12/29/2022  Provider: Haley Huggins MD    CHIEF COMPLAINT       Chief Complaint   Patient presents with    Rectal Bleeding         HISTORY OF PRESENT ILLNESS   (Location/Symptom, Timing/Onset,Context/Setting, Quality, Duration, Modifying Factors, Severity)  Note limiting factors. Jono Olivier is a 67 y.o. male who presents to the emergency department for evaluation of rectal bleeding. Patient was sitting in chair watching TV when he felt the chair being wet and noticed that was bloody. Went to have a bowel movement and basically just passed blood and even had some bloody dripping from his rectal area. First time something like this is happened. He denies lightheadedness dizziness. He denies abdominal pain. No rectal pain. Patient is on Eliquis for Atrial fib. Last colonoscopy was 2 years ago without complication. HPI    NursingNotes were reviewed. REVIEW OF SYSTEMS    (2-9 systems for level 4, 10 or more for level 5)     Review of Systems   Constitutional:  Negative for chills and diaphoresis. HENT:  Negative for congestion, ear pain, mouth sores and sore throat. Eyes:  Negative for photophobia and discharge. Respiratory:  Negative for cough, chest tightness, shortness of breath and wheezing. Cardiovascular:  Negative for chest pain and palpitations. Gastrointestinal:  Positive for anal bleeding and rectal pain. Negative for abdominal distention, abdominal pain, diarrhea, nausea and vomiting. Endocrine: Negative for cold intolerance. Genitourinary:  Negative for difficulty urinating, hematuria and urgency. Musculoskeletal:  Negative for arthralgias. Skin:  Negative for pallor and rash. Allergic/Immunologic: Negative for immunocompromised state. Neurological:  Negative for dizziness and syncope.    Hematological:  Negative for adenopathy. Psychiatric/Behavioral:  Negative for agitation and hallucinations. All other systems reviewed and are negative. Except as noted above the remainder of the review of systems was reviewed and negative. PAST MEDICAL HISTORY     Past Medical History:   Diagnosis Date    Atrial fibrillation [facility])     Erectile dysfunction     Heart attack ([facility] [address] 2018    Hyperlipidemia     Hypertension     MI (myocardial infarction) ([facility] [address] 2001    Prostate cancer [facility]) 2011    Prostatectomy Dr. Yun Peace    Type II or unspecified type diabetes mellitus without mention of complication, not stated as uncontrolled     Unspecified sleep apnea     CPAP Machine         SURGICALHISTORY       Past Surgical History:   Procedure Laterality Date    CARDIAC CATHETERIZATION  2016 2018    COLONOSCOPY  2009 2016    Dr. Jack Sherman  2001    3 stents place post Heart Attack    EYE SURGERY Bilateral 9/2015 10/2015    cataract    PROSTATECTOMY  2011    Dr. Lulu Reynolds. Ap Ayala       Previous Medications    APIXABAN (ELIQUIS) 5 MG TABS TABLET    Take 1 tablet by mouth 2 times daily    ASPIRIN 81 MG TABLET    Take 81 mg by mouth 2 times daily. CALCIUM CARBONATE-VIT D-MIN (CALCIUM 1200 PO)    Take  by mouth daily. CPAP MACHINE MISC    by Does not apply route. DOFETILIDE (TIKOSYN) 125 MCG CAPSULE    Take 125 mcg by mouth 2 times daily    DOFETILIDE (TIKOSYN) 250 MCG CAPSULE    Take 250 mcg by mouth 2 times daily    EMPAGLIFLOZIN (JARDIANCE) 10 MG TABLET    Take 10 mg by mouth daily    FREESTYLE LITE STRIP    TEST BLOOD SUGAR ONCE DAILY    LEVOTHYROXINE (SYNTHROID) 50 MCG TABLET    take 1 tablet by mouth once daily    METFORMIN (GLUCOPHAGE) 500 MG TABLET    Take 500 mg by mouth Daily.       METOPROLOL SUCCINATE (TOPROL XL) 25 MG EXTENDED RELEASE TABLET    Take 1 tablet by mouth daily    NITROGLYCERIN (NITROSTAT) 0.4 MG SL TABLET    Place 0.4 mg under the tongue every 5 minutes as needed. RAMIPRIL (ALTACE) 10 MG TABLET    Take 10 mg by mouth daily. RANOLAZINE (RANEXA) 500 MG EXTENDED RELEASE TABLET    take 1 tablet by mouth twice a day    ROSUVASTATIN (CRESTOR) 10 MG TABLET    Take 10 mg by mouth daily. VITAMIN B-12 (CYANOCOBALAMIN) 100 MCG TABLET    Take 50 mcg by mouth daily       ALLERGIES     Patient has no known allergies. FAMILY HISTORY       Family History   Problem Relation Age of Onset    Stroke Mother           SOCIAL HISTORY       Social History     Socioeconomic History    Marital status:      Spouse name: None    Number of children: None    Years of education: None    Highest education level: None   Tobacco Use    Smoking status: Former     Types: Cigarettes     Quit date: 1998     Years since quittin.9    Smokeless tobacco: Never   Vaping Use    Vaping Use: Never used   Substance and Sexual Activity    Alcohol use: Yes     Alcohol/week: 2.0 standard drinks     Types: 1 Cans of beer, 1 Glasses of wine per week     Comment: social    Drug use: No       SCREENINGS    Otis Coma Scale  Eye Opening: Spontaneous  Best Verbal Response: Oriented  Best Motor Response: Obeys commands  Otis Coma Scale Score: 15 @FLOW(07124421)@      PHYSICAL EXAM    (up to 7 for level 4, 8 or more for level 5)     ED Triage Vitals [22 1422]   BP Temp Temp src Heart Rate Resp SpO2 Height Weight   132/67 97 °F (36.1 °C) -- 69 20 95 % -- --       Physical Exam  Vitals and nursing note reviewed. Constitutional:       Appearance: He is well-developed. HENT:      Head: Normocephalic. Nose: Nose normal.   Eyes:      Conjunctiva/sclera: Conjunctivae normal.      Pupils: Pupils are equal, round, and reactive to light. Cardiovascular:      Rate and Rhythm: Normal rate and regular rhythm. Heart sounds: Normal heart sounds. Pulmonary:      Effort: Pulmonary effort is normal.      Breath sounds: Normal breath sounds.    Abdominal:      General: Bowel sounds are normal.      Palpations: Abdomen is soft. Tenderness: There is no abdominal tenderness. There is no guarding. Musculoskeletal:         General: Normal range of motion. Cervical back: Normal range of motion and neck supple. Skin:     General: Skin is warm and dry. Capillary Refill: Capillary refill takes less than 2 seconds. Neurological:      Mental Status: He is alert and oriented to person, place, and time. Psychiatric:         Mood and Affect: Mood normal.       DIAGNOSTIC RESULTS     EKG: All EKG's are interpreted by the Emergency Department Physician who either signs or Co-signsthis chart in the absence of a cardiologist.      RADIOLOGY:   Buddie Graft such as CT, Ultrasound and MRI are read by the radiologist. Plain radiographic images are visualized and preliminarily interpreted by the emergency physician with the below findings:      Interpretation per the Radiologist below, if available at the time ofthis note:    CT ABDOMEN PELVIS W IV CONTRAST Additional Contrast? None    (Results Pending)         ED BEDSIDE ULTRASOUND:   Performed by ED Physician - none    LABS:  Labs Reviewed   CBC WITH AUTO DIFFERENTIAL - Abnormal; Notable for the following components:       Result Value    MCV 99.2 (*)     MCH 33.1 (*)     All other components within normal limits   COMPREHENSIVE METABOLIC PANEL - Abnormal; Notable for the following components:    Glucose 126 (*)     All other components within normal limits   POCT CREATININE - Normal   TYPE AND SCREEN       All other labs were within normal range or not returned as of this dictation. EMERGENCY DEPARTMENT COURSE and DIFFERENTIAL DIAGNOSIS/MDM:   Vitals:    Vitals:    12/29/22 1422 12/29/22 1545   BP: 132/67 (!) 93/59   Pulse: 69 63   Resp: 20 20   Temp: 97 °F (36.1 °C)    SpO2: 95% 96%          MDM    Patient had no active bleeding here. Hemoglobin over 15 and vital signs remained stable.   Hemoglobin over 15Hemoglobin greater than 15 and normal vitals    DC to follow-up with gastroenterology. Anusol Rx    CONSULTS:  None    PROCEDURES:  Unless otherwise noted below, none     Procedures    FINAL IMPRESSION      1.  Rectal bleeding          DISPOSITION/PLAN   DISPOSITION Decision To Discharge 12/29/2022 05:17:13 PM      PATIENT REFERRED TO:  ABDOUL Yang CNP  [address] [address]  [phone]    In 1 week      Kvng Nance MD  [address] [place] [address]  [phone]          DISCHARGE MEDICATIONS:  New Prescriptions    HYDROCORTISONE (ANUSOL-HC) 25 MG SUPPOSITORY    Place 1 suppository rectally 2 times daily          (Please note that portions of this note were completed with a voice recognition program.  Efforts were made to edit the dictations but occasionally words are mis-transcribed.)    Wilfrid Hoffman MD (electronically signed)  Attending Emergency Physician          Wilfrid Hoffman MD  12/29/22 [phone]

## 2023-01-17 ENCOUNTER — OFFICE VISIT (OUTPATIENT)
Dept: GASTROENTEROLOGY | Age: 73
End: 2023-01-17
Payer: MEDICARE

## 2023-01-17 VITALS — HEIGHT: 72 IN | WEIGHT: 272 LBS | BODY MASS INDEX: 36.84 KG/M2 | OXYGEN SATURATION: 100 % | HEART RATE: 62 BPM

## 2023-01-17 DIAGNOSIS — K64.8 INTERNAL HEMORRHOIDS: Primary | ICD-10-CM

## 2023-01-17 DIAGNOSIS — K62.5 RECTAL BLEEDING: ICD-10-CM

## 2023-01-17 PROCEDURE — G8417 CALC BMI ABV UP PARAM F/U: HCPCS | Performed by: INTERNAL MEDICINE

## 2023-01-17 PROCEDURE — 3017F COLORECTAL CA SCREEN DOC REV: CPT | Performed by: INTERNAL MEDICINE

## 2023-01-17 PROCEDURE — G8484 FLU IMMUNIZE NO ADMIN: HCPCS | Performed by: INTERNAL MEDICINE

## 2023-01-17 PROCEDURE — 1123F ACP DISCUSS/DSCN MKR DOCD: CPT | Performed by: INTERNAL MEDICINE

## 2023-01-17 PROCEDURE — 1036F TOBACCO NON-USER: CPT | Performed by: INTERNAL MEDICINE

## 2023-01-17 PROCEDURE — G8427 DOCREV CUR MEDS BY ELIG CLIN: HCPCS | Performed by: INTERNAL MEDICINE

## 2023-01-17 PROCEDURE — 99203 OFFICE O/P NEW LOW 30 MIN: CPT | Performed by: INTERNAL MEDICINE

## 2023-01-17 RX ORDER — ZINC OXIDE AND COCOA BUTTER 270; 2052 MG/1; MG/1
SUPPOSITORY RECTAL
Qty: 24 SUPPOSITORY | Refills: 3 | Status: SHIPPED | OUTPATIENT
Start: 2023-01-17

## 2023-01-17 NOTE — PROGRESS NOTES
Gastroenterology Clinic Visit    Marquise Marrero  [id number]  Chief Complaint   Patient presents with    Follow-up     HPI: 67 y.o. male presents to the clinic with symptoms of rectal bleeding. Patient reports having 2 episodes in the last 2 weeks. Patient initially presented to the emergency room on December 30 with unprovoked spontaneous blood in his undergarments. He does not recall any change in his bowel movements at the time of the symptoms. He was evaluated in the emergency room and discharged home with prescription for [facility] suppositories. Patient did not have any symptoms until 3 days ago when his symptoms reoccurred. Patient denies any abdominal or rectal pain with the symptoms. He reports about 2 to 3 teaspoon of blood loss at a time. He does have a history of hemorrhoids but has never had issues or bleeding with them. History of colonoscopy in 2016 and 2020 at which time internal hemorrhoids, diminutive polyps and diverticulosis was noted. Previous GI work up/Endoscopic investigations:   Colonoscopy: 7/16/2020: Diminutive polyp, internal hemorrhoids, sigmoid diverticulosis  Colonoscopy: 12/23/2016: 5 to 8 mm polyp, internal hemorrhoids sigmoid diverticulosis    Review of Systems   All other systems reviewed and are negative.      Past Medical History:   Diagnosis Date    Atrial fibrillation [facility])     Erectile dysfunction     Heart attack ([facility] [address] 2018    Hyperlipidemia     Hypertension     MI (myocardial infarction) ([facility] [address] 2001    Prostate cancer [facility]) 2011    Prostatectomy Dr. Haven Higgins    Type II or unspecified type diabetes mellitus without mention of complication, not stated as uncontrolled     Unspecified sleep apnea     CPAP Machine     Past Surgical History:   Procedure Laterality Date    CARDIAC CATHETERIZATION  2016 2018    COLONOSCOPY  2009 2016    Dr. Charanjit Lopez  2001    3 stents place post Heart Attack    EYE SURGERY Bilateral 9/2015 10/2015 cataract    PROSTATECTOMY      Dr. Afshan Bach. Elisa Shaw     Current Outpatient Medications on File Prior to Visit   Medication Sig Dispense Refill    ranolazine (RANEXA) 500 MG extended release tablet take 1 tablet by mouth twice a day      FREESTYLE LITE strip TEST BLOOD SUGAR ONCE DAILY      dofetilide (TIKOSYN) 125 MCG capsule Take 125 mcg by mouth 2 times daily      dofetilide (TIKOSYN) 250 MCG capsule Take 250 mcg by mouth 2 times daily      empagliflozin (JARDIANCE) 10 MG tablet Take 10 mg by mouth daily      apixaban (ELIQUIS) 5 MG TABS tablet Take 1 tablet by mouth 2 times daily 60 tablet 3    metoprolol succinate (TOPROL XL) 25 MG extended release tablet Take 1 tablet by mouth daily 30 tablet 3    vitamin B-12 (CYANOCOBALAMIN) 100 MCG tablet Take 50 mcg by mouth daily      aspirin 81 MG tablet Take 81 mg by mouth 2 times daily. Calcium Carbonate-Vit D-Min (CALCIUM 1200 PO) Take  by mouth daily. CPAP Machine MISC by Does not apply route. levothyroxine (SYNTHROID) 50 MCG tablet take 1 tablet by mouth once daily 90 tablet 3    ramipril (ALTACE) 10 MG tablet Take 10 mg by mouth daily. metformin (GLUCOPHAGE) 500 MG tablet Take 500 mg by mouth Daily. rosuvastatin (CRESTOR) 10 MG tablet Take 10 mg by mouth daily. nitroGLYCERIN (NITROSTAT) 0.4 MG SL tablet Place 0.4 mg under the tongue every 5 minutes as needed      hydrocortisone (ANUSOL-HC) 25 MG suppository Place 1 suppository rectally 2 times daily 12 suppository 0     No current facility-administered medications on file prior to visit.      Family History   Problem Relation Age of Onset    Stroke Mother     Colon Cancer Neg Hx      Social History     Socioeconomic History    Marital status:    Tobacco Use    Smoking status: Former     Types: Cigarettes     Quit date: 1998     Years since quittin.9    Smokeless tobacco: Never   Vaping Use    Vaping Use: Never used   Substance and Sexual Activity    Alcohol use: Yes     Alcohol/week: 2.0 standard drinks     Types: 1 Cans of beer, 1 Glasses of wine per week     Comment: social    Drug use: No       Pulse 62, height 6' (1.829 m), weight 272 lb (123.4 kg), SpO2 100 %. Physical Exam  Constitutional:       General: He is not in acute distress. Appearance: Normal appearance. He is well-developed. Eyes:      General: No scleral icterus. Cardiovascular:      Rate and Rhythm: Normal rate and regular rhythm. Pulmonary:      Effort: Pulmonary effort is normal.      Breath sounds: Normal breath sounds. Abdominal:      General: Bowel sounds are normal. There is no distension. Palpations: Abdomen is soft. There is no mass. Tenderness: There is no abdominal tenderness. There is no guarding or rebound. Genitourinary:     Rectum: Internal hemorrhoid present. No mass, anal fissure or external hemorrhoid. Normal anal tone. Musculoskeletal:         General: Normal range of motion. Lymphadenopathy:      Cervical: No cervical adenopathy. Neurological:      Mental Status: He is alert and oriented to person, place, and time. Psychiatric:         Behavior: Behavior normal.         Thought Content:  Thought content normal.         Judgment: Judgment normal.       Laboratory, Pathology, Radiology reviewed indetail with relevant important investigations summarized below:  Lab Results   Component Value Date    WBC 7.4 12/29/2022    HGB 15.9 12/29/2022    HCT 47.7 12/29/2022    MCV 99.2 (H) 12/29/2022     12/29/2022     No results found for: IRON, TIBC, FERRITIN  No results found for: Bard Sin   No results found for: FOLATE  Lab Results   Component Value Date    LABALBU 4.4 12/29/2022      Lab Results   Component Value Date    ALT 30 12/29/2022    AST 23 12/29/2022    ALKPHOS 80 12/29/2022    BILITOT 0.5 12/29/2022     CT Result (most recent):  CT ABDOMEN PELVIS W IV CONTRAST 12/29/2022    Narrative  EXAMINATION:  CT OF THE ABDOMEN AND PELVIS WITH CONTRAST 12/29/2022 4:28 pm    TECHNIQUE:  CT of the abdomen and pelvis was performed with the administration of  intravenous contrast. Multiplanar reformatted images are provided for review.  Automated exposure control, iterative reconstruction, and/or weight based  adjustment of the mA/kV was utilized to reduce the radiation dose to as low  as reasonably achievable.    COMPARISON:  None.    HISTORY:  ORDERING SYSTEM PROVIDED HISTORY: lower abd pain  TECHNOLOGIST PROVIDED HISTORY:  Reason for exam:->lower abd pain  Additional Contrast?->None  Decision Support Exception - unselect if not a suspected or confirmed  emergency medical condition->Emergency Medical Condition (MA)  What reading provider will be dictating this exam?->CRC    FINDINGS:  Lower Chest: No airspace consolidation or pleural effusion.  Small calcified  granuloma lateral right lung base.  There are chronic parenchymal changes.  Small hiatal hernia.    Organs: Liver is normal in size with no focal lesions.  Gallbladder is  present with no calcified stones.  Spleen is not enlarged.  Pancreas and  adrenal glands appear unremarkable.  There is symmetric enhancement of the  kidneys with no hydronephrosis or perinephric infiltration.  There are  multiple bilateral renal cysts measuring up to 9 cm on the right.    GI/Bowel: No obstructing or constricting lesions.  Relatively large amount of  retained stool in the colon.  Appendix is not definitively identified.  No  pericecal inflammation to suggest acute appendicitis.    Pelvis: Bladder appears unremarkable.  There is no significant free fluid.    Peritoneum/Retroperitoneum: No free air or significant adenopathy.  Atherosclerotic changes of the abdominal aorta.    Bones/Soft Tissues: Unremarkable.    Impression  No acute process in the abdomen and pelvis.    Constipation.    Multiple bilateral renal cysts.      Assessment and Plan:  72 y.o. male with clinical history and examination consistent with internal  hemorrhoids, with 2 episodes of spontaneous nonprotocol bleeding, risk factors include being on Eliquis. Patient appears to have responded initially to [facility], given unprovoked spontaneous bleeding recommend consultation with colorectal surgery for consideration of banding. In the interim recommend avoiding constipation, stool softeners, perianal care, calomel suppositories  1. Internal hemorrhoids  2. Rectal bleeding  - Avoid constipation, keep stool soft and regular  - Consider Colace 100 mg nightly, Increase water intake, High fiber diet (May add fiber supplement like Metamucil or benefiber)  - Avoid dry wipes, patient advised to use water to clean perineum after a bowel movement. Use wet wipes/towel (cotton). - Anusol suppositories when hemorrhoids are symptomatic  - Calmol 4 suppositories nightly for the next 2 to 3 weeks  Rx provided  - Rectal Protectant-Emollient (CALMOL-4) 76-10 % SUPP; 1 to 2 times per day preferably after BM  Dispense: 24 suppository; Refill: 3    - Christi Vargas MD, Colorectal Surgery, [facility]    Return in about 2 months (around 3/17/2023). Joshua Cotter MD   Staff Gastroenterologist  [facility]    Please note this report has been partially produced using speech recognition software and may cause or contain errors related to thatsystem including grammar, punctuation and spelling as well as words and phrases that may seem inappropriate. If there are questions or concerns please feel free to contact me to clarify.

## 2023-01-24 ENCOUNTER — OFFICE VISIT (OUTPATIENT)
Dept: SURGERY | Age: 73
End: 2023-01-24
Payer: MEDICARE

## 2023-01-24 VITALS
BODY MASS INDEX: 36.84 KG/M2 | TEMPERATURE: 97.8 F | HEART RATE: 65 BPM | WEIGHT: 272 LBS | HEIGHT: 72 IN | OXYGEN SATURATION: 97 %

## 2023-01-24 DIAGNOSIS — K64.1 GRADE II HEMORRHOIDS: Primary | ICD-10-CM

## 2023-01-24 PROCEDURE — G8417 CALC BMI ABV UP PARAM F/U: HCPCS | Performed by: COLON & RECTAL SURGERY

## 2023-01-24 PROCEDURE — G8484 FLU IMMUNIZE NO ADMIN: HCPCS | Performed by: COLON & RECTAL SURGERY

## 2023-01-24 PROCEDURE — 46221 LIGATION OF HEMORRHOID(S): CPT | Performed by: COLON & RECTAL SURGERY

## 2023-01-24 PROCEDURE — 1036F TOBACCO NON-USER: CPT | Performed by: COLON & RECTAL SURGERY

## 2023-01-24 PROCEDURE — 99203 OFFICE O/P NEW LOW 30 MIN: CPT | Performed by: COLON & RECTAL SURGERY

## 2023-01-24 PROCEDURE — 3017F COLORECTAL CA SCREEN DOC REV: CPT | Performed by: COLON & RECTAL SURGERY

## 2023-01-24 PROCEDURE — G8427 DOCREV CUR MEDS BY ELIG CLIN: HCPCS | Performed by: COLON & RECTAL SURGERY

## 2023-01-24 PROCEDURE — 1123F ACP DISCUSS/DSCN MKR DOCD: CPT | Performed by: COLON & RECTAL SURGERY

## 2023-01-24 ASSESSMENT — ENCOUNTER SYMPTOMS
ABDOMINAL PAIN: 0
CONSTIPATION: 0
ANAL BLEEDING: 1
COLOR CHANGE: 0
DIARRHEA: 0
RECTAL PAIN: 0
SHORTNESS OF BREATH: 0
VOMITING: 0
CHEST TIGHTNESS: 0

## 2023-01-24 NOTE — PROGRESS NOTES
Subjective:      Patient ID: Keyon Cantu is a 67 y.o. male who presents for:  Chief Complaint   Patient presents with    New Patient       This is a 67-year-old male who has been on Eliquis for 3 years for what he describes as atrial fibrillation. He says he has converted but I am not sure if he has converted or whether it is rate controlled A. fib. He has intermittent episodes of rectal bleeding. He was seen by Dr. Guillermina Castillo. Previous colonoscopies were reviewed from outside hospital.  No additional endoscopic evaluation required by him. I reviewed his medication list as well as his history. His bleeding is usually self-limited. He denies abdominal pain or any unintentional weight loss. Past medical and surgical history reviewed as well.       Past Medical History:   Diagnosis Date    Atrial fibrillation [facility])     Erectile dysfunction     Heart attack ([facility] [address] 2018    Hyperlipidemia     Hypertension     MI (myocardial infarction) ([facility] [address]     Prostate cancer [facility]) 2011    Prostatectomy Dr. Roxana Galvan    Type II or unspecified type diabetes mellitus without mention of complication, not stated as uncontrolled     Unspecified sleep apnea     CPAP Machine     Past Surgical History:   Procedure Laterality Date    CARDIAC CATHETERIZATION  2016    COLONOSCOPY  2009    Dr. Danette Muhammad      3 stents place post Heart Attack    EYE SURGERY Bilateral 2015 10/2015    cataract    PROSTATECTOMY      Dr. Livia Asif History     Socioeconomic History    Marital status:      Spouse name: Not on file    Number of children: Not on file    Years of education: Not on file    Highest education level: Not on file   Occupational History    Not on file   Tobacco Use    Smoking status: Former     Types: Cigarettes     Quit date: 1998     Years since quittin.9    Smokeless tobacco: Never   Vaping Use    Vaping Use: Never used   Substance and Sexual Activity    Alcohol use: Yes     Alcohol/week: 2.0 standard drinks     Types: 1 Cans of beer, 1 Glasses of wine per week     Comment: social    Drug use: No    Sexual activity: Not on file   Other Topics Concern    Not on file   Social History Narrative    Not on file     Social Determinants of Health     Financial Resource Strain: Not on file   Food Insecurity: Not on file   Transportation Needs: Not on file   Physical Activity: Not on file   Stress: Not on file   Social Connections: Not on file   Intimate Partner Violence: Not on file   Housing Stability: Not on file     Family History   Problem Relation Age of Onset    Stroke Mother     Colon Cancer Neg Hx      Allergies:  Patient has no known allergies. Review of Systems   Constitutional:  Negative for activity change, appetite change, fatigue, fever and unexpected weight change. HENT:  Negative for congestion. Respiratory:  Negative for chest tightness and shortness of breath. Cardiovascular:  Negative for chest pain and palpitations. Gastrointestinal:  Positive for anal bleeding. Negative for abdominal pain, constipation, diarrhea, rectal pain and vomiting. Genitourinary:  Negative for difficulty urinating. Musculoskeletal:  Negative for arthralgias. Skin:  Negative for color change. Neurological:  Negative for dizziness and headaches. Hematological:  Does not bruise/bleed easily. Psychiatric/Behavioral:  Negative for agitation. Objective:    Pulse 65   Temp 97.8 °F (36.6 °C) (Temporal)   Ht 6' (1.829 m)   Wt 272 lb (123.4 kg)   SpO2 97%   BMI 36.89 kg/m²     Physical Exam  Constitutional:       General: He is not in acute distress. Appearance: He is well-developed. He is not diaphoretic. HENT:      Head: Normocephalic and atraumatic. Eyes:      Pupils: Pupils are equal, round, and reactive to light. Cardiovascular:      Rate and Rhythm: Normal rate and regular rhythm.       Heart sounds: Normal heart sounds. Pulmonary:      Effort: Pulmonary effort is normal. No respiratory distress. Breath sounds: Normal breath sounds. No wheezing. Abdominal:      General: There is no distension. Palpations: Abdomen is soft. Tenderness: There is no abdominal tenderness. There is no guarding. Genitourinary:     Comments: Anal inspection shows some prolapse present left lateral.    Anoscopy was performed. There is a grade 2/3 internal hemorrhoid left lateral.  No other significant hemorrhoids noted. Musculoskeletal:         General: No tenderness. Normal range of motion. Cervical back: Normal range of motion. Skin:     General: Skin is warm and dry. Findings: No erythema or rash. Neurological:      Mental Status: He is alert and oriented to person, place, and time. Psychiatric:         Behavior: Behavior normal.         Thought Content: Thought content normal.         Judgment: Judgment normal.     Procedure: I discussed with the patient the risks and benefits of hemorrhoid banding. Risks of infection bleeding and recurrence of hemorrhoids were all addressed. Postoperative pain post-banding was discussed as well. I discussed the risks and benefits, and the patient wishes to proceed. Proper consents were obtained. With the patient in left lateral position a lubricated anoscope was inserted without difficulty. The above-mentioned hemorrhoids were located in the Left lateral after the hemorrhoids were grasped to ensure that they were insensate. Once appropriate, the hemorrhoid bands were deployed. Excellent results were obtained. The anoscope was removed. Patient tolerated procedure without difficulty. There was no blood loss during the procedure. Assessment/Plan:          Diagnosis Orders   1.  Grade II hemorrhoids          Post banding instructions were given    I asked him to discuss with Dr. Ellen Gregorio whether he requires long-term anticoagulation if indeed he potentially has converted back to sinus rhythm. He will discuss this in the next month at his next follow-up appointment. Although stopping Eliquis is the most effective way of stopping his intermittent hemorrhoid bleeding, if he requires long-term anticoagulation, I will continue to follow him for assistance as needed with hemorrhoid intervention. Please note this report has beenpartially produced using speech recognition software and may cause contain errors related to that system including grammar, punctuation and spelling as well as words and phrases that may seem inappropriate.  If there arequestions or concerns please feel free to contact me to clarify

## 2023-01-26 DIAGNOSIS — Z85.46 H/O PROSTATE CANCER: ICD-10-CM

## 2023-01-26 LAB — PROSTATE SPECIFIC ANTIGEN: 0.08 NG/ML (ref 0–4)

## 2023-02-17 ENCOUNTER — OFFICE VISIT (OUTPATIENT)
Dept: UROLOGY | Age: 73
End: 2023-02-17
Payer: MEDICARE

## 2023-02-17 VITALS
WEIGHT: 278 LBS | BODY MASS INDEX: 36.84 KG/M2 | HEART RATE: 60 BPM | SYSTOLIC BLOOD PRESSURE: 130 MMHG | HEIGHT: 73 IN | DIASTOLIC BLOOD PRESSURE: 70 MMHG

## 2023-02-17 DIAGNOSIS — Z85.46 H/O PROSTATE CANCER: Primary | ICD-10-CM

## 2023-02-17 PROCEDURE — G8484 FLU IMMUNIZE NO ADMIN: HCPCS | Performed by: UROLOGY

## 2023-02-17 PROCEDURE — G8417 CALC BMI ABV UP PARAM F/U: HCPCS | Performed by: UROLOGY

## 2023-02-17 PROCEDURE — 1036F TOBACCO NON-USER: CPT | Performed by: UROLOGY

## 2023-02-17 PROCEDURE — 3075F SYST BP GE 130 - 139MM HG: CPT | Performed by: UROLOGY

## 2023-02-17 PROCEDURE — 3017F COLORECTAL CA SCREEN DOC REV: CPT | Performed by: UROLOGY

## 2023-02-17 PROCEDURE — 3078F DIAST BP <80 MM HG: CPT | Performed by: UROLOGY

## 2023-02-17 PROCEDURE — 99213 OFFICE O/P EST LOW 20 MIN: CPT | Performed by: UROLOGY

## 2023-02-17 PROCEDURE — 1123F ACP DISCUSS/DSCN MKR DOCD: CPT | Performed by: UROLOGY

## 2023-02-17 PROCEDURE — G8427 DOCREV CUR MEDS BY ELIG CLIN: HCPCS | Performed by: UROLOGY

## 2023-02-17 ASSESSMENT — ENCOUNTER SYMPTOMS
BLOOD IN STOOL: 1
ABDOMINAL DISTENTION: 0
ABDOMINAL PAIN: 0

## 2023-02-17 NOTE — PROGRESS NOTES
Subjective:      Patient ID: Em Serrano is a 75 y.o. male    HPI  This is a 74 yo white male with h/o HTN, NUBIA, DM, CAD/Stents, AFib on Eliquis/Tikosyn, and [place] 7 prostate cancer s/p RALP (rt nerve sparing/angeles LND) on 12. Since last seen on 22, he has no RAYMUNDO, frequency or urgency and feels the bladder empties. He has no hematuria or dysuria. He has no abd or flank pain and no wt loss or abnl bone pains. He has no interval medical or surgical problems. I reviewed his interval PSA today. PATH: LN: neg, [place] 4+3 = 7 angeles with PN invasion, margins neg, capsule neg, sv neg     Past Medical History:   Diagnosis Date    Atrial fibrillation [facility])     Erectile dysfunction     Heart attack ([facility] [address] 2018    Hyperlipidemia     Hypertension     MI (myocardial infarction) ([facility] [address]     Prostate cancer [facility]) 2011    Prostatectomy Dr. Vivian Lopez    Type II or unspecified type diabetes mellitus without mention of complication, not stated as uncontrolled     Unspecified sleep apnea     CPAP Machine     Past Surgical History:   Procedure Laterality Date    CARDIAC CATHETERIZATION   2018    COLONOSCOPY  2009    Dr. Faheem Almonte      3 stents place post Heart Attack    EYE SURGERY Bilateral 2015 10/2015    cataract    PROSTATECTOMY      Dr. Glory Strange. Milan Streeter History     Socioeconomic History    Marital status:      Spouse name: None    Number of children: None    Years of education: None    Highest education level: None   Tobacco Use    Smoking status: Former     Types: Cigarettes     Quit date: 1998     Years since quittin.0    Smokeless tobacco: Never   Vaping Use    Vaping Use: Never used   Substance and Sexual Activity    Alcohol use:  Yes     Alcohol/week: 2.0 standard drinks     Types: 1 Cans of beer, 1 Glasses of wine per week     Comment: social    Drug use: No     Family History   Problem Relation Age of Onset    Stroke Mother Colon Cancer Neg Hx      Current Outpatient Medications   Medication Sig Dispense Refill    Rectal Protectant-Emollient (CALMOL-4) 76-10 % SUPP 1 to 2 times per day preferably after BM 24 suppository 3    hydrocortisone (ANUSOL-HC) 25 MG suppository Place 1 suppository rectally 2 times daily 12 suppository 0    ranolazine (RANEXA) 500 MG extended release tablet take 1 tablet by mouth twice a day      FREESTYLE LITE strip TEST BLOOD SUGAR ONCE DAILY      dofetilide (TIKOSYN) 125 MCG capsule Take 125 mcg by mouth 2 times daily      dofetilide (TIKOSYN) 250 MCG capsule Take 250 mcg by mouth 2 times daily      empagliflozin (JARDIANCE) 10 MG tablet Take 10 mg by mouth daily      apixaban (ELIQUIS) 5 MG TABS tablet Take 1 tablet by mouth 2 times daily 60 tablet 3    metoprolol succinate (TOPROL XL) 25 MG extended release tablet Take 1 tablet by mouth daily 30 tablet 3    vitamin B-12 (CYANOCOBALAMIN) 100 MCG tablet Take 50 mcg by mouth daily      aspirin 81 MG tablet Take 81 mg by mouth 2 times daily. Calcium Carbonate-Vit D-Min (CALCIUM 1200 PO) Take  by mouth daily. CPAP Machine MISC by Does not apply route. levothyroxine (SYNTHROID) 50 MCG tablet take 1 tablet by mouth once daily 90 tablet 3    ramipril (ALTACE) 10 MG tablet Take 10 mg by mouth daily. metformin (GLUCOPHAGE) 500 MG tablet Take 500 mg by mouth Daily. rosuvastatin (CRESTOR) 10 MG tablet Take 10 mg by mouth daily. nitroGLYCERIN (NITROSTAT) 0.4 MG SL tablet Place 0.4 mg under the tongue every 5 minutes as needed       No current facility-administered medications for this visit. Patient has no known allergies. reviewed      Review of Systems   Constitutional:  Negative for unexpected weight change. Cardiovascular:  Negative for chest pain. Gastrointestinal:  Positive for blood in stool. Negative for abdominal distention and abdominal pain.    Genitourinary:  Negative for difficulty urinating, dysuria, flank pain and hematuria. Objective:   Physical Exam  Abdominal:      General: There is no distension. Palpations: Abdomen is soft. Tenderness: There is no abdominal tenderness. Neurological:      Mental Status: He is alert. Psychiatric:         Mood and Affect: Mood normal.          PSA   Date Value Ref Range Status   01/26/2023 0.08 0.00 - 4.00 ng/mL Final   01/31/2022 0.06 0.00 - 4.00 ng/mL Final   01/22/2021 0.07 0.00 - 6.22 ng/mL Final   01/14/2020 0.06 0.00 - 6.22 ng/mL Final   01/18/2019 0.05 0.00 - 6.22 ng/mL Final     Diagnostic Psa   Date Value Ref Range Status   11/28/2014 0.02 0.00 - 5.40 ng/mL Final   05/16/2014 0.02 0.00 - 5.40 ng/mL Final   11/14/2013 0.0 0.0 - 4.0 ng/mL Final   08/02/2013 0.0 0.0 - 4.0 ng/mL Final       Assessment: This is a 74 yo white male with h/o HTN, NUBIA, DM, CAD/Stents, AFib on Eliquis/Tikosyn, and [place] 7 prostate cancer s/p RALP (rt nerve sparing/angeles LND) with favorable pathologic features and a low and relatively stable PSA that has risen slightly since last seen. This is unlikely of clinical significance given pathology and 11 + yrs from prostatectomy and I reassured him of these findings. I recommend he continue with yearly PSA follow-up.        Plan:      F/U 1 yr for PSA        Vishal Sanderson MD

## 2023-02-28 ENCOUNTER — OFFICE VISIT (OUTPATIENT)
Dept: SURGERY | Age: 73
End: 2023-02-28
Payer: MEDICARE

## 2023-02-28 VITALS
BODY MASS INDEX: 36.84 KG/M2 | OXYGEN SATURATION: 98 % | WEIGHT: 278 LBS | HEART RATE: 58 BPM | TEMPERATURE: 97.6 F | HEIGHT: 73 IN

## 2023-02-28 DIAGNOSIS — K64.2 GRADE III HEMORRHOIDS: Primary | ICD-10-CM

## 2023-02-28 PROCEDURE — 1123F ACP DISCUSS/DSCN MKR DOCD: CPT | Performed by: COLON & RECTAL SURGERY

## 2023-02-28 PROCEDURE — G8427 DOCREV CUR MEDS BY ELIG CLIN: HCPCS | Performed by: COLON & RECTAL SURGERY

## 2023-02-28 PROCEDURE — G8484 FLU IMMUNIZE NO ADMIN: HCPCS | Performed by: COLON & RECTAL SURGERY

## 2023-02-28 PROCEDURE — 99024 POSTOP FOLLOW-UP VISIT: CPT | Performed by: COLON & RECTAL SURGERY

## 2023-02-28 PROCEDURE — 46221 LIGATION OF HEMORRHOID(S): CPT | Performed by: COLON & RECTAL SURGERY

## 2023-02-28 PROCEDURE — G8417 CALC BMI ABV UP PARAM F/U: HCPCS | Performed by: COLON & RECTAL SURGERY

## 2023-02-28 PROCEDURE — 1036F TOBACCO NON-USER: CPT | Performed by: COLON & RECTAL SURGERY

## 2023-02-28 PROCEDURE — 3017F COLORECTAL CA SCREEN DOC REV: CPT | Performed by: COLON & RECTAL SURGERY

## 2023-02-28 ASSESSMENT — ENCOUNTER SYMPTOMS
VOMITING: 0
CONSTIPATION: 0
CHEST TIGHTNESS: 0
ABDOMINAL PAIN: 0
DIARRHEA: 0
COLOR CHANGE: 0
SHORTNESS OF BREATH: 0
ANAL BLEEDING: 0

## 2023-02-28 NOTE — PROGRESS NOTES
Subjective:      Patient ID: Bria Coombs is a 75 y.o. male who presents for:  Chief Complaint   Patient presents with    Hemorrhoids       This is a 50-year-old male who comes in the office for continued hemorrhoid treatment. He has been off his Eliquis for 48 hours. He has had no bleeding since last visit. His last banding went without difficulty. The remainder of his past medical and surgical history was reviewed. Past Medical History:   Diagnosis Date    Atrial fibrillation [facility])     Erectile dysfunction     [facility] 2018    Hyperlipidemia     Hypertension     MI (myocardial infarction) [facility]     Prostate cancer [facility]) 2011    Prostatectomy Dr. Janine Oneill    Type II or unspecified type diabetes mellitus without mention of complication, not stated as uncontrolled     Unspecified sleep apnea     CPAP Machine     Past Surgical History:   Procedure Laterality Date    CARDIAC CATHETERIZATION  2016    COLONOSCOPY  2009    Dr. Cesar Miller      3 stents place post Heart Attack    EYE SURGERY Bilateral 2015 10/2015    cataract    PROSTATECTOMY      Dr. Marissa Haskins. [facility] Sep History     Socioeconomic History    Marital status:      Spouse name: Not on file    Number of children: Not on file    Years of education: Not on file    Highest education level: Not on file   Occupational History    Not on file   Tobacco Use    Smoking status: Former     Types: Cigarettes     Quit date: 1998     Years since quittin.0    Smokeless tobacco: Never   Vaping Use    Vaping Use: Never used   Substance and Sexual Activity    Alcohol use:  Yes     Alcohol/week: 2.0 standard drinks     Types: 1 Cans of beer, 1 Glasses of wine per week     Comment: social    Drug use: No    Sexual activity: Not on file   Other Topics Concern    Not on file   Social History Narrative    Not on file     Social Determinants of Health     Financial Resource Strain: Not on file   Food Insecurity: Not on file   Transportation Needs: Not on file   Physical Activity: Not on file   Stress: Not on file   Social Connections: Not on file   Intimate Partner Violence: Not on file   Housing Stability: Not on file     Family History   Problem Relation Age of Onset    Stroke Mother     Colon Cancer Neg Hx      Allergies:  Patient has no known allergies. Review of Systems   Constitutional:  Negative for activity change, fatigue and fever. HENT:  Negative for congestion. Respiratory:  Negative for chest tightness and shortness of breath. Cardiovascular:  Negative for chest pain. Gastrointestinal:  Negative for abdominal pain, anal bleeding, constipation, diarrhea and vomiting. Genitourinary:  Negative for difficulty urinating. Musculoskeletal:  Negative for arthralgias. Skin:  Negative for color change. Neurological:  Negative for dizziness and headaches. Hematological:  Bruises/bleeds easily. Psychiatric/Behavioral:  Negative for agitation. Objective:    Pulse 58   Temp 97.6 °F (36.4 °C) (Temporal)   Ht 6' 1\" (1.854 m)   Wt 278 lb (126.1 kg)   SpO2 98%   BMI 36.68 kg/m²     Physical Exam  Constitutional:       General: He is not in acute distress. Appearance: He is well-developed. He is not diaphoretic. HENT:      Head: Normocephalic and atraumatic. Eyes:      Pupils: Pupils are equal, round, and reactive to light. Cardiovascular:      Rate and Rhythm: Normal rate and regular rhythm. Heart sounds: Normal heart sounds. Pulmonary:      Effort: Pulmonary effort is normal. No respiratory distress. Breath sounds: Normal breath sounds. No wheezing. Abdominal:      General: There is no distension. Palpations: Abdomen is soft. Tenderness: There is no abdominal tenderness. There is no guarding. Genitourinary:     Comments: On anal inspection there is prolapsing grade 3 internal hemorrhoid right anterior position.     No evidence of thrombosed hemorrhoids or signs of abscess. Musculoskeletal:         General: No tenderness. Normal range of motion. Cervical back: Normal range of motion. Skin:     General: Skin is warm and dry. Findings: No erythema or rash. Neurological:      Mental Status: He is alert and oriented to person, place, and time. Psychiatric:         Behavior: Behavior normal.         Thought Content: Thought content normal.         Judgment: Judgment normal.     Procedure: I discussed with the patient the risks and benefits of hemorrhoid banding. Risks of infection bleeding and recurrence of hemorrhoids were all addressed. Postoperative pain post-banding was discussed as well. I discussed the risks and benefits, and the patient wishes to proceed. Proper consents were obtained. With the patient in left lateral position a lubricated anoscope was inserted without difficulty. The above-mentioned hemorrhoids were located in the Right anterior after the hemorrhoids were grasped to ensure that they were insensate. Once appropriate, the hemorrhoid bands were deployed. Excellent results were obtained. The anoscope was removed. Patient tolerated procedure without difficulty. There was no blood loss during the procedure. Assessment/Plan:          Diagnosis Orders   1. Grade III hemorrhoids          Post banding instructions were given    He will resume his Eliquis tomorrow    He will return back to see us in the office in 4 to 6 weeks if any bleeding were to recur. All questions answered. Please note this report has beenpartially produced using speech recognition software and may cause contain errors related to that system including grammar, punctuation and spelling as well as words and phrases that may seem inappropriate.  If there arequestions or concerns please feel free to contact me to clarify

## 2023-12-19 PROBLEM — E78.5 HYPERLIPIDEMIA: Status: ACTIVE | Noted: 2023-12-19

## 2023-12-19 PROBLEM — I48.19 PERSISTENT ATRIAL FIBRILLATION WITH RVR (MULTI): Status: ACTIVE | Noted: 2023-12-19

## 2023-12-19 PROBLEM — I49.5 SSS (SICK SINUS SYNDROME) (MULTI): Status: ACTIVE | Noted: 2023-12-19

## 2023-12-19 PROBLEM — R42 VERTIGO: Status: ACTIVE | Noted: 2023-12-19

## 2023-12-19 PROBLEM — I25.10 ARTERIOSCLEROTIC CORONARY ARTERY DISEASE: Status: ACTIVE | Noted: 2023-12-19

## 2023-12-19 PROBLEM — C61 PROSTATE CANCER (MULTI): Status: ACTIVE | Noted: 2023-12-19

## 2023-12-19 PROBLEM — R00.1 SINUS BRADYCARDIA: Status: ACTIVE | Noted: 2023-12-19

## 2023-12-19 PROBLEM — E11.9 DIABETES MELLITUS (MULTI): Status: ACTIVE | Noted: 2023-12-19

## 2023-12-19 PROBLEM — D35.2 BENIGN NEOPLASM OF PITUITARY GLAND (MULTI): Status: ACTIVE | Noted: 2023-12-19

## 2023-12-19 PROBLEM — I10 HYPERTENSION: Status: ACTIVE | Noted: 2023-12-19

## 2023-12-19 RX ORDER — DOFETILIDE 0.25 MG/1
CAPSULE ORAL
COMMUNITY
Start: 2021-09-30 | End: 2024-02-19 | Stop reason: SDUPTHER

## 2023-12-19 RX ORDER — RANOLAZINE 1000 MG/1
1 TABLET, EXTENDED RELEASE ORAL EVERY 12 HOURS
COMMUNITY
Start: 2021-11-01 | End: 2024-02-19 | Stop reason: SDUPTHER

## 2023-12-19 RX ORDER — ASPIRIN 81 MG/1
162 TABLET ORAL DAILY
COMMUNITY
End: 2024-02-19 | Stop reason: SDUPTHER

## 2023-12-19 RX ORDER — DOFETILIDE 0.12 MG/1
1 CAPSULE ORAL 2 TIMES DAILY
COMMUNITY
Start: 2021-09-30 | End: 2024-02-19 | Stop reason: SDUPTHER

## 2023-12-19 RX ORDER — METFORMIN HYDROCHLORIDE 500 MG/1
1 TABLET ORAL
COMMUNITY
Start: 2021-09-02

## 2023-12-19 RX ORDER — LANOLIN ALCOHOL/MO/W.PET/CERES
1 CREAM (GRAM) TOPICAL DAILY
COMMUNITY

## 2023-12-19 RX ORDER — ROSUVASTATIN CALCIUM 10 MG/1
1 TABLET, COATED ORAL NIGHTLY
COMMUNITY
Start: 2021-09-02 | End: 2024-01-19 | Stop reason: SDUPTHER

## 2023-12-19 RX ORDER — CALCIUM CARBONATE 600 MG
2 TABLET ORAL DAILY
COMMUNITY

## 2023-12-19 RX ORDER — NITROGLYCERIN 0.4 MG/1
0.4 TABLET SUBLINGUAL EVERY 5 MIN PRN
COMMUNITY
Start: 2021-03-16 | End: 2024-02-19 | Stop reason: SDUPTHER

## 2023-12-19 RX ORDER — RAMIPRIL 10 MG/1
1 CAPSULE ORAL DAILY
COMMUNITY
Start: 2021-09-02 | End: 2024-02-19 | Stop reason: SDUPTHER

## 2023-12-19 RX ORDER — LEVOTHYROXINE SODIUM 50 UG/1
1 TABLET ORAL
COMMUNITY
Start: 2021-09-02

## 2023-12-19 RX ORDER — METOPROLOL SUCCINATE 25 MG/1
1 TABLET, EXTENDED RELEASE ORAL EVERY OTHER DAY
COMMUNITY
Start: 2021-03-16 | End: 2024-01-19 | Stop reason: SDUPTHER

## 2024-01-19 DIAGNOSIS — I10 HYPERTENSION, UNSPECIFIED TYPE: ICD-10-CM

## 2024-01-19 DIAGNOSIS — E78.2 MIXED HYPERLIPIDEMIA: ICD-10-CM

## 2024-01-19 RX ORDER — ROSUVASTATIN CALCIUM 10 MG/1
10 TABLET, COATED ORAL NIGHTLY
Qty: 90 TABLET | Refills: 1 | Status: SHIPPED | OUTPATIENT
Start: 2024-01-19

## 2024-01-19 RX ORDER — METOPROLOL SUCCINATE 25 MG/1
25 TABLET, EXTENDED RELEASE ORAL EVERY OTHER DAY
Qty: 45 TABLET | Refills: 3 | Status: SHIPPED | OUTPATIENT
Start: 2024-01-19 | End: 2025-01-18

## 2024-02-08 DIAGNOSIS — Z85.46 H/O PROSTATE CANCER: ICD-10-CM

## 2024-02-08 LAB — PSA SERPL-MCNC: 0.08 NG/ML (ref 0–4)

## 2024-02-12 ENCOUNTER — APPOINTMENT (OUTPATIENT)
Dept: CARDIOLOGY | Facility: CLINIC | Age: 74
End: 2024-02-12
Payer: MEDICARE

## 2024-02-19 ENCOUNTER — OFFICE VISIT (OUTPATIENT)
Dept: CARDIOLOGY | Facility: CLINIC | Age: 74
End: 2024-02-19
Payer: MEDICARE

## 2024-02-19 VITALS
BODY MASS INDEX: 36.98 KG/M2 | SYSTOLIC BLOOD PRESSURE: 124 MMHG | HEART RATE: 54 BPM | WEIGHT: 279 LBS | HEIGHT: 73 IN | DIASTOLIC BLOOD PRESSURE: 72 MMHG

## 2024-02-19 DIAGNOSIS — I10 PRIMARY HYPERTENSION: ICD-10-CM

## 2024-02-19 DIAGNOSIS — E78.2 MIXED HYPERLIPIDEMIA: ICD-10-CM

## 2024-02-19 DIAGNOSIS — I25.118 CORONARY ARTERY DISEASE OF NATIVE ARTERY OF NATIVE HEART WITH STABLE ANGINA PECTORIS (CMS-HCC): Primary | ICD-10-CM

## 2024-02-19 DIAGNOSIS — I48.19 PERSISTENT ATRIAL FIBRILLATION WITH RVR (MULTI): ICD-10-CM

## 2024-02-19 PROCEDURE — 1036F TOBACCO NON-USER: CPT | Performed by: INTERNAL MEDICINE

## 2024-02-19 PROCEDURE — 4010F ACE/ARB THERAPY RXD/TAKEN: CPT | Performed by: INTERNAL MEDICINE

## 2024-02-19 PROCEDURE — 3078F DIAST BP <80 MM HG: CPT | Performed by: INTERNAL MEDICINE

## 2024-02-19 PROCEDURE — 93000 ELECTROCARDIOGRAM COMPLETE: CPT | Performed by: INTERNAL MEDICINE

## 2024-02-19 PROCEDURE — 3074F SYST BP LT 130 MM HG: CPT | Performed by: INTERNAL MEDICINE

## 2024-02-19 PROCEDURE — 99214 OFFICE O/P EST MOD 30 MIN: CPT | Performed by: INTERNAL MEDICINE

## 2024-02-19 PROCEDURE — 1159F MED LIST DOCD IN RCRD: CPT | Performed by: INTERNAL MEDICINE

## 2024-02-19 RX ORDER — RANOLAZINE 1000 MG/1
1 TABLET, EXTENDED RELEASE ORAL EVERY 12 HOURS
Qty: 200 TABLET | Refills: 1 | Status: SHIPPED | OUTPATIENT
Start: 2024-02-19

## 2024-02-19 RX ORDER — DOFETILIDE 0.12 MG/1
125 CAPSULE ORAL 2 TIMES DAILY
Qty: 200 CAPSULE | Refills: 1 | Status: SHIPPED | OUTPATIENT
Start: 2024-02-19

## 2024-02-19 RX ORDER — RAMIPRIL 10 MG/1
10 CAPSULE ORAL DAILY
Qty: 100 CAPSULE | Refills: 1 | Status: SHIPPED | OUTPATIENT
Start: 2024-02-19

## 2024-02-19 RX ORDER — ASPIRIN 81 MG/1
162 TABLET ORAL DAILY
Qty: 100 TABLET | Refills: 1 | Status: SHIPPED | OUTPATIENT
Start: 2024-02-19

## 2024-02-19 RX ORDER — NITROGLYCERIN 0.4 MG/1
0.4 TABLET SUBLINGUAL EVERY 5 MIN PRN
Qty: 25 TABLET | Refills: 5 | Status: SHIPPED | OUTPATIENT
Start: 2024-02-19

## 2024-02-19 RX ORDER — DOFETILIDE 0.25 MG/1
CAPSULE ORAL
Qty: 200 CAPSULE | Refills: 1 | Status: SHIPPED | OUTPATIENT
Start: 2024-02-19

## 2024-02-19 ASSESSMENT — ENCOUNTER SYMPTOMS
OCCASIONAL FEELINGS OF UNSTEADINESS: 0
DEPRESSION: 0
LOSS OF SENSATION IN FEET: 0

## 2024-02-19 NOTE — PATIENT INSTRUCTIONS
Please bring all medicines, vitamins, and herbal supplements with you in original bottles to every appointment!!!!    Prescriptions will not be filled unless you are compliant with your follow up appointments or have a follow up appointment scheduled as per instruction of your physician. Refills should be requested at the time of your visit.

## 2024-02-19 NOTE — PROGRESS NOTES
Patient:  Andrew Mai  YOB: 1950  MRN: [id number]       Impression/Plan:     Diagnoses and all orders for this visit:  Coronary artery disease of native artery of native heart with stable angina pectoris (CMS/HCC)  -    His description of walk-through angina has not changed in over 3 years.  It is likely due to increasing collaterals with activity that supply his circumflex system.  Will continue current medications at this point in time as no evidence to suggest progressive disease    Mixed hyperlipidemia  -     Cholesterol with excellent control at 1 14 October 2023 continue current statin regimen    Persistent atrial fibrillation with RVR (CMS/HCC)  -     No clinical recurrence on current dose of dofetilide without adverse effects of that agent.  Renal function remained stable.  No bleeding on Eliquis with stable renal function continue current dosages    Primary hypertension  -     Stable.  I have encouraged him to continue to work on carbohydrate reduction such that he can lose weight.  Continue current medical regimen      Chief Complaint/Active Symptoms:       Andrew Mai is a 74 y.o. male who presents with coronary artery disease and remote circumflex/LAD angioplasty. October 2021 coronary angiography LM-30%. LAD-irregular with patent stent, septal  90%. CX-ostial occlusion with rich collaterals from right coronary. RCA-35% proximal with normal IFR. LVEDP 10. LVEF 55% as type 2 diabetes, hypertension, hyperlipidemia and mild sick sinus syndrome as well as persistent atrial fibrillation began on Tikosyn 2018.     Last here 8/14/2023 and was doing well.    ECG 2/19/2024 sinus bradycardia rate 54 QTc 462 ms no acute ST changes    Lab work 2/16/2024 normal hemoglobin/hematocrit.  Platelet count 142 normal liver function sodium 137 testing 4.4 creatinine 1.07 A1c 7.0 previously 6.7    He denies angina, dyspnea, palpitation, edema, lightheadedness or syncope.  He has had no symptoms  of claudication or neurologic deterioration.  There have been no hospitalizations or emergency room visits since last office visit.    Not watching his diet optimally but working on reducing carbs.  No bleeding    He does note occasionally having an episode of quarter sized lower sternal discomfort after walking for a few minutes it last for couple minutes then goes away as he continues to walk.  He has had this intermittently ever since his angiography 2021 that showed chronic ostial circumflex occlusion with rich collaterals.               Review of Systems: Unremarkable except as noted above    Meds     Current Outpatient Medications   Medication Instructions    apixaban (Eliquis) 5 mg tablet 1 tablet, oral, 2 times daily    aspirin 162 mg, oral, Daily    Bacillus coagulans (PROBIOTIC, B. COAGULANS, ORAL) 1 capsule, oral, Daily    calcium carbonate 600 mg calcium (1,500 mg) tablet 2 tablets, oral, Daily    cyanocobalamin (Vitamin B-12) 1,000 mcg tablet 1 tablet, oral, Daily    dofetilide (Tikosyn) 125 mcg capsule Take 1 capsule (125 mcg) by mouth 2 times a day.    dofetilide (Tikosyn) 250 mcg capsule TAKE 1 CAPSULE BY MOUTH TWICE DAILY (TAKE ALONG WITH 125 MCG CAPSULE)    empagliflozin (Jardiance) 10 mg 1 tablet, oral, Daily with breakfast    levothyroxine (Synthroid, Levoxyl) 50 mcg tablet 1 tablet, oral, Daily before breakfast    metFORMIN (Glucophage) 500 mg tablet 1 tablet, oral, Daily with breakfast    metoprolol succinate XL (TOPROL-XL) 25 mg, oral, Every other day    nitroglycerin (NITROSTAT) 0.4 mg, sublingual, Every 5 min PRN    ramipril (Altace) 10 mg capsule 1 capsule, oral, Daily    ranolazine (Ranexa) 1,000 mg 12 hr tablet 1 tablet, oral, Every 12 hours    rosuvastatin (CRESTOR) 10 mg, oral, Nightly        Allergies   No Known Allergies      Annotated Problems     Specialty Problems          Cardiology Problems    Arteriosclerotic coronary artery disease    Hyperlipidemia    Hypertension     "Persistent atrial fibrillation with RVR (CMS/[facility])    Sinus bradycardia    SSS (sick sinus syndrome) (CMS/[facility])        Problem List     Patient Active Problem List    Diagnosis Date Noted    Arteriosclerotic coronary artery disease 12/19/2023    Benign neoplasm of pituitary gland (CMS/[facility]) 12/19/2023    Diabetes mellitus (CMS/[facility]) 12/19/2023    Hyperlipidemia 12/19/2023    Hypertension 12/19/2023    Persistent atrial fibrillation with RVR (CMS/[facility]) 12/19/2023    Prostate cancer (CMS/[facility]) 12/19/2023    Sinus bradycardia 12/19/2023    SSS (sick sinus syndrome) (CMS/[facility]) 12/19/2023    Vertigo 12/19/2023       Objective:     Vitals:    02/19/24 1054   BP: 124/72   BP Location: Left arm   Patient Position: Sitting   Pulse: 54   Weight: 127 kg (279 lb)   Height: 1.854 m (6' 1\")      Wt Readings from Last 4 Encounters:   02/19/24 127 kg (279 lb)   08/14/23 124 kg (273 lb)   02/13/23 124 kg (274 lb)   08/10/22 120 kg (264 lb)           LAB:     Lab Results   Component Value Date    WBC 7.0 10/18/2021    HGB 16.1 10/18/2021    HCT 48.0 10/18/2021     (L) 10/18/2021     10/18/2021    K 4.0 10/18/2021     10/18/2021    CREATININE 0.98 10/18/2021    BUN 20 10/18/2021    CO2 22 10/18/2021    HGBA1C 7.0 (H) 02/16/2024       Diagnostic Studies:     Patient was never admitted.      Radiology:     No orders to display       Physical Exam     General Appearance: alert and oriented to person, place and time, in no acute distress  Cardiovascular: normal rate, regular rhythm, normal S1 and S2, no murmurs, rubs, clicks, or gallops,  no JVD  Pulmonary/Chest: clear to auscultation bilaterally- no wheezes, rales or rhonchi, normal air movement, no respiratory distress  Abdomen: obese, soft, non-tender, non-distended, normal bowel sounds, no masses   Extremities: no cyanosis, clubbing or edema  Skin: warm and dry, no rash or erythema  Eyes: EOMI  Neck: supple and non-tender without mass, no thyromegaly   Neurological: alert, " oriented, normal speech, no focal findings or movement disorder noted              Scribe Attestation  By signing my name below, I, Laurie King LPN, Scribe   attest that this documentation has been prepared under the direction and in the presence of Robin Medarno MD.

## 2024-03-01 ENCOUNTER — OFFICE VISIT (OUTPATIENT)
Dept: UROLOGY | Age: 74
End: 2024-03-01
Payer: MEDICARE

## 2024-03-01 VITALS
HEIGHT: 73 IN | DIASTOLIC BLOOD PRESSURE: 76 MMHG | HEART RATE: 50 BPM | BODY MASS INDEX: 37.11 KG/M2 | SYSTOLIC BLOOD PRESSURE: 136 MMHG | WEIGHT: 280 LBS

## 2024-03-01 DIAGNOSIS — C61 PROSTATE CANCER (HCC): Primary | ICD-10-CM

## 2024-03-01 PROCEDURE — G8427 DOCREV CUR MEDS BY ELIG CLIN: HCPCS | Performed by: UROLOGY

## 2024-03-01 PROCEDURE — G8417 CALC BMI ABV UP PARAM F/U: HCPCS | Performed by: UROLOGY

## 2024-03-01 PROCEDURE — 3017F COLORECTAL CA SCREEN DOC REV: CPT | Performed by: UROLOGY

## 2024-03-01 PROCEDURE — 1123F ACP DISCUSS/DSCN MKR DOCD: CPT | Performed by: UROLOGY

## 2024-03-01 PROCEDURE — 3078F DIAST BP <80 MM HG: CPT | Performed by: UROLOGY

## 2024-03-01 PROCEDURE — 99213 OFFICE O/P EST LOW 20 MIN: CPT | Performed by: UROLOGY

## 2024-03-01 PROCEDURE — G8484 FLU IMMUNIZE NO ADMIN: HCPCS | Performed by: UROLOGY

## 2024-03-01 PROCEDURE — 3075F SYST BP GE 130 - 139MM HG: CPT | Performed by: UROLOGY

## 2024-03-01 PROCEDURE — 1036F TOBACCO NON-USER: CPT | Performed by: UROLOGY

## 2024-03-01 ASSESSMENT — ENCOUNTER SYMPTOMS
ABDOMINAL DISTENTION: 0
ABDOMINAL PAIN: 0

## 2024-03-01 NOTE — PROGRESS NOTES
Chaperone for Intimate Exam    1. Was chaperone offered as part of the rooming process? offered, declined   2. If Chaperone is declined by patient, NA: chaperone was available and exam completed  3. Chaperone is N/A  
Subjective:      Patient ID: Otoniel Alas is a 74 y.o. male    HPI This is a 75 yo white male with h/o HTN, NUBIA, DM, CAD/Stents, AFib on Eliquis/Tikosyn, and [place] 7 prostate cancer s/p RALP (rt nerve sparing/angeles LND) on 12. Since last seen on 23, he has no RAYMUNDO, frequency or urgency and feels the bladder empties. He has no hematuria or dysuria. He has no abd or flank pain and no wt loss or abnl bone pains. He has no interval medical or surgical problems. I reviewed his interval PSA today.      PATH: LN: neg, Sara 4+3 = 7 angeles with PN invasion, margins neg, capsule neg, sv neg     Past Medical History:   Diagnosis Date   • Atrial fibrillation (HCC)    • Erectile dysfunction    • Heart attack (HCC)    • Hyperlipidemia    • Hypertension    • MI (myocardial infarction) (HCC)    • Prostate cancer (HCC)     Prostatectomy Dr. Ba   • Type II or unspecified type diabetes mellitus without mention of complication, not stated as uncontrolled    • Unspecified sleep apnea     CPAP Machine     Past Surgical History:   Procedure Laterality Date   • CARDIAC CATHETERIZATION  2016   • COLONOSCOPY  2009    Dr. VIRGINIA Saavedra   • CORONARY ANGIOPLASTY WITH STENT PLACEMENT      3 stents place post Heart Attack   • EYE SURGERY Bilateral 2015 10/2015    cataract   • PROSTATECTOMY      Dr. Ba/Dr. Meyers     Social History     Socioeconomic History   • Marital status:      Spouse name: None   • Number of children: None   • Years of education: None   • Highest education level: None   Tobacco Use   • Smoking status: Former     Current packs/day: 0.00     Types: Cigarettes     Quit date: 1998     Years since quittin.0   • Smokeless tobacco: Never   Vaping Use   • Vaping Use: Never used   Substance and Sexual Activity   • Alcohol use: Yes     Alcohol/week: 2.0 standard drinks of alcohol     Types: 1 Cans of beer, 1 Glasses of wine per week     Comment: social   • Drug use: No 
Full range of motion of upper and lower extremities, no joint tenderness/swelling.

## 2024-08-19 ENCOUNTER — APPOINTMENT (OUTPATIENT)
Dept: CARDIOLOGY | Facility: CLINIC | Age: 74
End: 2024-08-19
Payer: MEDICARE

## 2024-08-19 VITALS
DIASTOLIC BLOOD PRESSURE: 70 MMHG | HEIGHT: 73 IN | WEIGHT: 283 LBS | HEART RATE: 60 BPM | SYSTOLIC BLOOD PRESSURE: 120 MMHG | BODY MASS INDEX: 37.51 KG/M2

## 2024-08-19 DIAGNOSIS — I48.11 LONGSTANDING PERSISTENT ATRIAL FIBRILLATION (MULTI): ICD-10-CM

## 2024-08-19 DIAGNOSIS — I10 PRIMARY HYPERTENSION: ICD-10-CM

## 2024-08-19 DIAGNOSIS — I25.10 ARTERIOSCLEROTIC CORONARY ARTERY DISEASE: Primary | ICD-10-CM

## 2024-08-19 DIAGNOSIS — E78.2 MIXED HYPERLIPIDEMIA: ICD-10-CM

## 2024-08-19 PROCEDURE — 93000 ELECTROCARDIOGRAM COMPLETE: CPT | Performed by: INTERNAL MEDICINE

## 2024-08-19 PROCEDURE — G2211 COMPLEX E/M VISIT ADD ON: HCPCS | Performed by: INTERNAL MEDICINE

## 2024-08-19 PROCEDURE — 3074F SYST BP LT 130 MM HG: CPT | Performed by: INTERNAL MEDICINE

## 2024-08-19 PROCEDURE — 99213 OFFICE O/P EST LOW 20 MIN: CPT | Performed by: INTERNAL MEDICINE

## 2024-08-19 PROCEDURE — 4010F ACE/ARB THERAPY RXD/TAKEN: CPT | Performed by: INTERNAL MEDICINE

## 2024-08-19 PROCEDURE — 1159F MED LIST DOCD IN RCRD: CPT | Performed by: INTERNAL MEDICINE

## 2024-08-19 PROCEDURE — 1036F TOBACCO NON-USER: CPT | Performed by: INTERNAL MEDICINE

## 2024-08-19 PROCEDURE — 3078F DIAST BP <80 MM HG: CPT | Performed by: INTERNAL MEDICINE

## 2024-08-19 RX ORDER — RAMIPRIL 10 MG/1
10 CAPSULE ORAL DAILY
Qty: 90 CAPSULE | Refills: 3 | Status: SHIPPED | OUTPATIENT
Start: 2024-08-19 | End: 2025-08-19

## 2024-08-19 RX ORDER — METOPROLOL SUCCINATE 25 MG/1
25 TABLET, EXTENDED RELEASE ORAL EVERY OTHER DAY
Qty: 45 TABLET | Refills: 3 | Status: SHIPPED | OUTPATIENT
Start: 2024-08-19 | End: 2025-08-19

## 2024-08-19 RX ORDER — DOFETILIDE 0.25 MG/1
CAPSULE ORAL
Qty: 200 CAPSULE | Refills: 1 | Status: SHIPPED | OUTPATIENT
Start: 2024-08-19

## 2024-08-19 RX ORDER — RANOLAZINE 1000 MG/1
1 TABLET, EXTENDED RELEASE ORAL EVERY 12 HOURS
Qty: 180 TABLET | Refills: 3 | Status: SHIPPED | OUTPATIENT
Start: 2024-08-19 | End: 2025-08-19

## 2024-08-19 RX ORDER — ROSUVASTATIN CALCIUM 10 MG/1
10 TABLET, COATED ORAL NIGHTLY
Qty: 90 TABLET | Refills: 3 | Status: SHIPPED | OUTPATIENT
Start: 2024-08-19 | End: 2025-08-19

## 2024-08-19 RX ORDER — DOFETILIDE 0.12 MG/1
125 CAPSULE ORAL 2 TIMES DAILY
Qty: 200 CAPSULE | Refills: 1 | Status: SHIPPED | OUTPATIENT
Start: 2024-08-19

## 2024-08-19 NOTE — PROGRESS NOTES
Patient:  Andrew Mai  YOB: 1950  MRN: [id number]       Impression/Plan:     Diagnoses and all orders for this visit:  Arteriosclerotic coronary artery disease  -     Remains quite active he does have walk-through angina but now extraordinarily aware compared to previously.  Continue current regimen of antiplatelet and statin therapy.  Longstanding persistent atrial fibrillation (Multi)  -    No clinical recurrence ECG shows reasonable QTc continue Tikosyn renal function remained stable as well no bleeding on anticoagulation  Mixed hyperlipidemia  -     rosuvastatin (Crestor) 10 mg tablet; Take 1 tablet (10 mg) by mouth once daily at bedtime.  Primary hypertension  -     Controlled without adverse effect of the current medical regimen      Chief Complaint/Active Symptoms:       Andrew Mai is a 74 y.o. male who presents with  coronary artery disease and remote circumflex/LAD angioplasty. 2021 coronary angiography LM-30%. LAD-irregular with patent stent, septal  90%. CX-ostial occlusion with rich collaterals from right coronary. RCA-35% proximal with normal IFR. LVEDP 10. LVEF 55% as type 2 diabetes, hypertension, hyperlipidemia and mild sick sinus syndrome as well as persistent atrial fibrillation began on Tikosyn .     I had last seen him 2024 at which time he had walk-through angina unchanged for the prior 3 years.    EC2024 sinus rhythm rate 58.  QTc 482 ms.  Otherwise unremarkable    Lab work 2024 cholesterol 105 triglycerides 160 HDL 32 LDL 41 CBC normal thyroid normal CMP normal    He denies angina, dyspnea, palpitation, edema, lightheadedness or syncope.  He has had no symptoms of claudication or neurologic deterioration.  There have been no hospitalizations or emergency room visits since last office visit.    Over the last 3 to 4 years he has described effort angina and currently he said it is almost completely resolved.  Very rarely if he is going  up or particularly steep hill when he is walking he might have a twinge of angina but much better than previously.  He has had no dyspnea no claudication or lower extremity edema.  He has had no bleeding on anticoagulation.               Review of Systems: Unremarkable except as noted above    Meds     Current Outpatient Medications   Medication Instructions    apixaban (ELIQUIS) 5 mg, oral, 2 times daily    aspirin 162 mg, oral, Daily    Bacillus coagulans (PROBIOTIC, B. COAGULANS, ORAL) 1 capsule, oral, Daily    calcium carbonate 600 mg calcium (1,500 mg) tablet 2 tablets, oral, Daily    cyanocobalamin (Vitamin B-12) 1,000 mcg tablet 1 tablet, oral, Daily    dofetilide (Tikosyn) 250 mcg capsule TAKE 1 CAPSULE BY MOUTH TWICE DAILY (TAKE ALONG WITH 125 MCG CAPSULE)    dofetilide (TIKOSYN) 125 mcg, oral, 2 times daily    empagliflozin (Jardiance) 10 mg 1 tablet, oral, Daily with breakfast    levothyroxine (Synthroid, Levoxyl) 50 mcg tablet 1 tablet, oral, Daily before breakfast    metFORMIN (Glucophage) 500 mg tablet 1 tablet, oral, Daily with breakfast    metoprolol succinate XL (TOPROL-XL) 25 mg, oral, Every other day    nitroglycerin (NITROSTAT) 0.4 mg, sublingual, Every 5 min PRN    ramipril (ALTACE) 10 mg, oral, Daily    ranolazine (RANEXA) 1,000 mg, oral, Every 12 hours    rosuvastatin (CRESTOR) 10 mg, oral, Nightly        Allergies   No Known Allergies      Annotated Problems     Specialty Problems          Cardiology Problems    Arteriosclerotic coronary artery disease     October 2021 coronary angiography LM-30%. LAD-irregular with patent stent, septal  90%. CX-ostial occlusion with rich collaterals from right coronary. RCA-35% proximal with normal IFR. LVEDP 10. LVEF 55%         Hyperlipidemia    Hypertension    Longstanding persistent atrial fibrillation (Multi)     On Tikosyn as of 2018         Sinus bradycardia    SSS (sick sinus syndrome) (Multi)        Problem List     Patient Active Problem  "List    Diagnosis Date Noted    Arteriosclerotic coronary artery disease 12/19/2023    Benign neoplasm of pituitary gland (Multi) 12/19/2023    Diabetes mellitus (Multi) 12/19/2023    Hyperlipidemia 12/19/2023    Hypertension 12/19/2023    Longstanding persistent atrial fibrillation (Multi) 12/19/2023    Prostate cancer (Multi) 12/19/2023    Sinus bradycardia 12/19/2023    SSS (sick sinus syndrome) (Multi) 12/19/2023    Vertigo 12/19/2023       Objective:     Vitals:    08/19/24 0959   BP: 120/70   BP Location: Left arm   Patient Position: Sitting   Pulse: 60   Weight: 128 kg (283 lb)   Height: 1.854 m (6' 1\")      Wt Readings from Last 4 Encounters:   08/19/24 128 kg (283 lb)   02/19/24 127 kg (279 lb)   08/14/23 124 kg (273 lb)   02/13/23 124 kg (274 lb)           LAB:     Lab Results   Component Value Date    WBC 7.0 10/18/2021    HGB 16.1 10/18/2021    HCT 48.0 10/18/2021     (L) 10/18/2021     10/18/2021    K 4.0 10/18/2021     10/18/2021    CREATININE 0.98 10/18/2021    BUN 20 10/18/2021    CO2 22 10/18/2021    HGBA1C 6.6 (H) 06/07/2024       Diagnostic Studies:     Patient was never admitted.      Radiology:     No orders to display       Physical Exam     General Appearance: alert and oriented to person, place and time, in no acute distress  Cardiovascular: normal rate, regular rhythm, normal S1 and S2, no murmurs, rubs, clicks, or gallops,  no JVD  Pulmonary/Chest: clear to auscultation bilaterally- no wheezes, rales or rhonchi, normal air movement, no respiratory distress  Abdomen: soft, non-tender, non-distended, normal bowel sounds, no masses   Extremities: no cyanosis, clubbing or edema  Skin: warm and dry, no rash or erythema  Eyes: EOMI  Neck: supple and non-tender without mass, no thyromegaly   Neurological: alert, oriented, normal speech, no focal findings or movement disorder noted  Vascular:                   "

## 2024-08-19 NOTE — PATIENT INSTRUCTIONS
Continue same medications and treatments.     Please bring any lab results from other providers / physicians to your next appointment.     Please bring all medicines, vitamins, and herbal supplements with you when you come to the office.     Prescriptions will not be filled unless you are compliant with your follow up appointments or have a follow up appointment scheduled as per instruction of your physician. Refills should be requested at the time of your visit.    Scribe Attestation  By signing my name below, IAda Scribe   attest that this documentation has been prepared under the direction and in the presence of Robin Medrano MD.

## 2024-10-03 ENCOUNTER — TELEPHONE (OUTPATIENT)
Dept: CARDIOLOGY | Facility: CLINIC | Age: 74
End: 2024-10-03
Payer: MEDICARE

## 2024-10-03 DIAGNOSIS — I48.11 LONGSTANDING PERSISTENT ATRIAL FIBRILLATION (MULTI): ICD-10-CM

## 2024-10-03 NOTE — TELEPHONE ENCOUNTER
Received request for prescription refills from:   [facility] for patient.   Patient follows with Dr. Medrano     Request is for   Dofetilide 250 mcg BID  Dofetilide 125 mcg BID  Is patient currently on medication yes     Medications filled 8/19/2024 for 100 days plus 1 refill    Left message on machine for pt to return call to office.  Does pt need medications?  Which pharmacy is patient preference?

## 2024-10-04 DIAGNOSIS — I25.118 CORONARY ARTERY DISEASE OF NATIVE ARTERY OF NATIVE HEART WITH STABLE ANGINA PECTORIS: ICD-10-CM

## 2024-10-04 RX ORDER — ASPIRIN 81 MG/1
162 TABLET ORAL DAILY
Qty: 180 TABLET | Refills: 3 | Status: SHIPPED | OUTPATIENT
Start: 2024-10-04 | End: 2025-10-04

## 2024-10-04 NOTE — TELEPHONE ENCOUNTER
Spoke with patient who states the RX was ready for  at [facility] and med's were picked up. No need for refill.   Francisca Jose MA

## 2024-10-16 DIAGNOSIS — I48.11 LONGSTANDING PERSISTENT ATRIAL FIBRILLATION (MULTI): ICD-10-CM

## 2024-10-16 RX ORDER — DOFETILIDE 0.25 MG/1
CAPSULE ORAL
Qty: 200 CAPSULE | Refills: 1 | Status: SHIPPED | OUTPATIENT
Start: 2024-10-16

## 2024-10-16 RX ORDER — DOFETILIDE 0.12 MG/1
125 CAPSULE ORAL 2 TIMES DAILY
Qty: 200 CAPSULE | Refills: 1 | Status: SHIPPED | OUTPATIENT
Start: 2024-10-16

## 2024-12-31 DIAGNOSIS — I48.11 LONGSTANDING PERSISTENT ATRIAL FIBRILLATION (MULTI): ICD-10-CM

## 2024-12-31 RX ORDER — DOFETILIDE 0.12 MG/1
125 CAPSULE ORAL 2 TIMES DAILY
Qty: 180 CAPSULE | Refills: 1 | Status: SHIPPED | OUTPATIENT
Start: 2024-12-31 | End: 2025-12-31

## 2024-12-31 RX ORDER — DOFETILIDE 0.25 MG/1
CAPSULE ORAL
Qty: 180 CAPSULE | Refills: 1 | Status: SHIPPED | OUTPATIENT
Start: 2024-12-31

## 2025-02-17 ENCOUNTER — APPOINTMENT (OUTPATIENT)
Dept: CARDIOLOGY | Facility: CLINIC | Age: 75
End: 2025-02-17
Payer: MEDICARE

## 2025-02-17 VITALS
DIASTOLIC BLOOD PRESSURE: 70 MMHG | HEIGHT: 73 IN | WEIGHT: 279 LBS | SYSTOLIC BLOOD PRESSURE: 128 MMHG | HEART RATE: 59 BPM | BODY MASS INDEX: 36.98 KG/M2

## 2025-02-17 DIAGNOSIS — I25.10 ARTERIOSCLEROTIC CORONARY ARTERY DISEASE: ICD-10-CM

## 2025-02-17 DIAGNOSIS — I48.11 LONGSTANDING PERSISTENT ATRIAL FIBRILLATION (MULTI): ICD-10-CM

## 2025-02-17 DIAGNOSIS — E78.2 MIXED HYPERLIPIDEMIA: ICD-10-CM

## 2025-02-17 DIAGNOSIS — I10 PRIMARY HYPERTENSION: ICD-10-CM

## 2025-02-17 PROCEDURE — 3074F SYST BP LT 130 MM HG: CPT | Performed by: INTERNAL MEDICINE

## 2025-02-17 PROCEDURE — 1159F MED LIST DOCD IN RCRD: CPT | Performed by: INTERNAL MEDICINE

## 2025-02-17 PROCEDURE — 99213 OFFICE O/P EST LOW 20 MIN: CPT | Performed by: INTERNAL MEDICINE

## 2025-02-17 PROCEDURE — 93000 ELECTROCARDIOGRAM COMPLETE: CPT | Performed by: INTERNAL MEDICINE

## 2025-02-17 PROCEDURE — G2211 COMPLEX E/M VISIT ADD ON: HCPCS | Performed by: INTERNAL MEDICINE

## 2025-02-17 PROCEDURE — 1036F TOBACCO NON-USER: CPT | Performed by: INTERNAL MEDICINE

## 2025-02-17 PROCEDURE — 3078F DIAST BP <80 MM HG: CPT | Performed by: INTERNAL MEDICINE

## 2025-02-17 RX ORDER — DOFETILIDE 0.12 MG/1
125 CAPSULE ORAL 2 TIMES DAILY
Qty: 180 CAPSULE | Refills: 1 | Status: SHIPPED | OUTPATIENT
Start: 2025-02-17 | End: 2025-08-16

## 2025-02-17 RX ORDER — DOFETILIDE 0.25 MG/1
CAPSULE ORAL
Qty: 180 CAPSULE | Refills: 1 | Status: SHIPPED | OUTPATIENT
Start: 2025-02-17

## 2025-02-17 NOTE — PROGRESS NOTES
Patient:  Andrew Mai  YOB: 1950  MRN: [id number]       Impression/Plan:     Diagnoses and all orders for this visit:  Arteriosclerotic coronary artery disease  -     No angina at a normal functional capacity for his age  Longstanding persistent atrial fibrillation (Multi)  -     No recurrence  -     No bleeding on anticoagulation  -      QTc well under 500 continue current dose of Tikosyn  -     dofetilide (Tikosyn) 125 mcg capsule; Take 1 capsule (125 mcg) by mouth 2 times a day.  -     dofetilide (Tikosyn) 250 mcg capsule; TAKE 1 CAPSULE BY MOUTH TWICE DAILY (TAKE ALONG WITH 125 MCG CAPSULE)  -     Follow Up In Cardiology; Future  Primary hypertension  -    Well-controlled without adverse effect  Mixed hyperlipidemia  -     Lipid Panel; Future  -     Follow Up In Cardiology; Future      Chief Complaint/Active Symptoms:       Andrew Mai is a 75 y.o. male who presents with coronary artery disease and remote circumflex/LAD angioplasty. October 2021 coronary angiography LM-30%. LAD-irregular with patent stent, septal  90%. CX-ostial occlusion with rich collaterals from right coronary. RCA-35% proximal with normal IFR. LVEDP 10. LVEF 55% as type 2 diabetes, hypertension, hyperlipidemia and mild sick sinus syndrome as well as persistent atrial fibrillation began on Tikosyn 2018.     Last seen here 8/19/2024 at which time asymptomatic.  Over the prior 3 to 4 years had described effort angina but very rarely at that time that he noticed any symptoms whatsoever.  Perhaps he would have a twinge going up a steep hill.  No cardiovascular symptoms no bleeding on anticoagulation    Lab work 12/6/2024 CBC unremarkable platelet count 139.  CMP glucose 164 otherwise unremarkable.  Thyroid normal.  A1c 6.9  Last lipid October 2023 cholesterol 114 LDL 52    ECG 2/17/2025 normal sinus rhythm LVH by voltage QTc 300 ms    He denies angina, dyspnea, palpitation, edema, lightheadedness or syncope.  He has  had no symptoms of claudication or neurologic deterioration.  There have been no hospitalizations or emergency room visits since last office visit.    Remains quite active his prior walk-through angina has not recurred.  He has not felt recurrent atrial fibrillation and has had no bleeding issues               Review of Systems: Unremarkable except as noted above    Meds     Current Outpatient Medications   Medication Instructions    apixaban (ELIQUIS) 5 mg, oral, 2 times daily    aspirin 162 mg, oral, Daily    calcium carbonate 600 mg calcium (1,500 mg) tablet 2 tablets, Daily    cyanocobalamin (Vitamin B-12) 1,000 mcg tablet 1 tablet, Daily    dofetilide (Tikosyn) 250 mcg capsule TAKE 1 CAPSULE BY MOUTH TWICE DAILY (TAKE ALONG WITH 125 MCG CAPSULE)    dofetilide (TIKOSYN) 125 mcg, oral, 2 times daily    empagliflozin (Jardiance) 10 mg 1 tablet, Daily with breakfast    levothyroxine (Synthroid, Levoxyl) 50 mcg tablet 1 tablet, Daily before breakfast    metFORMIN (Glucophage) 500 mg tablet 1 tablet, Daily with breakfast    metoprolol succinate XL (TOPROL-XL) 25 mg, oral, Every other day    nitroglycerin (NITROSTAT) 0.4 mg, sublingual, Every 5 min PRN    ramipril (ALTACE) 10 mg, oral, Daily    ranolazine (RANEXA) 1,000 mg, oral, Every 12 hours    rosuvastatin (CRESTOR) 10 mg, oral, Nightly        Allergies   No Known Allergies      Annotated Problems     Specialty Problems          Cardiology Problems    Arteriosclerotic coronary artery disease     October 2021 coronary angiography LM-30%. LAD-irregular with patent stent, septal  90%. CX-ostial occlusion with rich collaterals from right coronary. RCA-35% proximal with normal IFR. LVEDP 10. LVEF 55%         Hyperlipidemia    Hypertension    Longstanding persistent atrial fibrillation (Multi)     On Tikosyn as of 2018         Sinus bradycardia    SSS (sick sinus syndrome) (Multi)        Problem List     Patient Active Problem List    Diagnosis Date Noted     "Arteriosclerotic coronary artery disease 12/19/2023    Benign neoplasm of pituitary gland (Multi) 12/19/2023    Diabetes mellitus (Multi) 12/19/2023    Hyperlipidemia 12/19/2023    Hypertension 12/19/2023    Longstanding persistent atrial fibrillation (Multi) 12/19/2023    Prostate cancer (Multi) 12/19/2023    Sinus bradycardia 12/19/2023    SSS (sick sinus syndrome) (Multi) 12/19/2023    Vertigo 12/19/2023       Objective:     Vitals:    02/17/25 0919   BP: 128/70   BP Location: Left arm   Patient Position: Sitting   Pulse: 59   Weight: 127 kg (279 lb)   Height: 1.854 m (6' 1\")      Wt Readings from Last 4 Encounters:   02/17/25 127 kg (279 lb)   08/19/24 128 kg (283 lb)   02/19/24 127 kg (279 lb)   08/14/23 124 kg (273 lb)           LAB:     Lab Results   Component Value Date    WBC 7.0 10/18/2021    HGB 16.1 10/18/2021    HCT 48.0 10/18/2021     (L) 10/18/2021     10/18/2021    K 4.0 10/18/2021     10/18/2021    CREATININE 0.98 10/18/2021    BUN 20 10/18/2021    CO2 22 10/18/2021    HGBA1C 6.9 (H) 12/06/2024       Diagnostic Studies:     Patient was never admitted.      Radiology:     No orders to display       Physical Exam     General Appearance: alert and oriented to person, place and time, in no acute distress  Cardiovascular: normal rate, regular rhythm, normal S1 and S2, no murmurs, rubs, clicks, or gallops,  no JVD  Pulmonary/Chest: clear to auscultation bilaterally- no wheezes, rales or rhonchi, normal air movement, no respiratory distress  Abdomen: soft, non-tender, non-distended, normal bowel sounds, no masses   Extremities: no cyanosis, clubbing or edema  Skin: warm and dry, no rash or erythema  Eyes: EOMI  Neck: supple and non-tender without mass, no thyromegaly   Neurological: alert, oriented, normal speech, no focal findings or movement disorder noted  Vascular: Pulses 2+                  "

## 2025-02-17 NOTE — PATIENT INSTRUCTIONS
FASTING LABS NEAR FUTURE   FOLLOW UP IN 6 MONTHS      DID YOU KNOW  We have a pharmacy here in the [facility].  They can fill all prescriptions, not just cardiac medications.  Prescriptions from other pharmacies can easily be transferred to the  pharmacy by the  pharmacist on site.   pharmacies offer FREE HOME DELIVERY on medications to anywhere in [place]. They can sync your medications. Typically prescriptions can be ready in 10 - 15 minutes. If pharmacy is unable to fill your  prescription or if cost is more than your paying now the Pharmacist can easily transfer back to your Pharmacy of choice. Pharmacy phone # [phone].      Please bring all medicines, vitamins, and herbal supplements with you in original bottles to every appointment  Prescriptions will not be filled unless you are compliant with your follow up appointments or have a follow up appointment scheduled as per instruction of your physician.   Refills should be requested at the time of your visit.

## 2025-02-18 LAB
CHOLEST SERPL-MCNC: 117 MG/DL
CHOLEST/HDLC SERPL: 2.9 (CALC)
HDLC SERPL-MCNC: 41 MG/DL
LDLC SERPL CALC-MCNC: 58 MG/DL (CALC)
NONHDLC SERPL-MCNC: 76 MG/DL (CALC)
TRIGL SERPL-MCNC: 94 MG/DL

## 2025-02-25 DIAGNOSIS — C61 PROSTATE CANCER (HCC): ICD-10-CM

## 2025-02-25 LAB — PSA SERPL-MCNC: 0.1 NG/ML (ref 0–4)

## 2025-03-04 ENCOUNTER — OFFICE VISIT (OUTPATIENT)
Dept: UROLOGY | Age: 75
End: 2025-03-04
Payer: MEDICARE

## 2025-03-04 VITALS
BODY MASS INDEX: 36.18 KG/M2 | HEART RATE: 64 BPM | HEIGHT: 73 IN | SYSTOLIC BLOOD PRESSURE: 120 MMHG | DIASTOLIC BLOOD PRESSURE: 64 MMHG | WEIGHT: 273 LBS

## 2025-03-04 DIAGNOSIS — Z85.46 H/O PROSTATE CANCER: Primary | ICD-10-CM

## 2025-03-04 PROCEDURE — G8417 CALC BMI ABV UP PARAM F/U: HCPCS | Performed by: UROLOGY

## 2025-03-04 PROCEDURE — 3074F SYST BP LT 130 MM HG: CPT | Performed by: UROLOGY

## 2025-03-04 PROCEDURE — 99212 OFFICE O/P EST SF 10 MIN: CPT | Performed by: UROLOGY

## 2025-03-04 PROCEDURE — 1123F ACP DISCUSS/DSCN MKR DOCD: CPT | Performed by: UROLOGY

## 2025-03-04 PROCEDURE — G8427 DOCREV CUR MEDS BY ELIG CLIN: HCPCS | Performed by: UROLOGY

## 2025-03-04 PROCEDURE — 1160F RVW MEDS BY RX/DR IN RCRD: CPT | Performed by: UROLOGY

## 2025-03-04 PROCEDURE — 1036F TOBACCO NON-USER: CPT | Performed by: UROLOGY

## 2025-03-04 PROCEDURE — 1159F MED LIST DOCD IN RCRD: CPT | Performed by: UROLOGY

## 2025-03-04 PROCEDURE — 3078F DIAST BP <80 MM HG: CPT | Performed by: UROLOGY

## 2025-03-04 ASSESSMENT — ENCOUNTER SYMPTOMS: ABDOMINAL PAIN: 0

## 2025-03-04 NOTE — PROGRESS NOTES
Subjective:      Patient ID: Otoniel Alas is a 75 y.o. male    HPI  This is a 74 yo white male with h/o HTN, NUBIA, DM, CAD/Stents, AFib on Eliquis/Tikosyn, and [place] prostate cancer s/p RALP (rt nerve sparing/angeles LND) on 12. Since last seen on 3/1/24, he has no RAYMUNDO, frequency or urgency and feels the bladder empties. He has no hematuria or dysuria. He has no abd or flank pain and no wt loss or abnl bone pains. He has no interval medical or surgical problems. I reviewed his interval PSA today.      PATH: LN: neg, Sara 4+3 = 7 angeles with PN invasion, margins neg, capsule neg, sv neg     Past Medical History:   Diagnosis Date    Atrial fibrillation (HCC)     Erectile dysfunction     Heart attack (HCC)     Hyperlipidemia     Hypertension     MI (myocardial infarction) (HCC)     Prostate cancer (HCC) 2011    Prostatectomy Dr. Ba    Type II or unspecified type diabetes mellitus without mention of complication, not stated as uncontrolled     Unspecified sleep apnea     CPAP Machine     Past Surgical History:   Procedure Laterality Date    CARDIAC CATHETERIZATION  2016    COLONOSCOPY  2009    Dr. VIRGINIA Saavedra    CORONARY ANGIOPLASTY WITH STENT PLACEMENT      3 stents place post Heart Attack    EYE SURGERY Bilateral 2015 10/2015    cataract    PROSTATECTOMY      Dr. Ba/Dr. Meyers     Social History     Socioeconomic History    Marital status:    Tobacco Use    Smoking status: Former     Current packs/day: 0.00     Types: Cigarettes     Quit date: 1998     Years since quittin.1    Smokeless tobacco: Never   Vaping Use    Vaping status: Never Used   Substance and Sexual Activity    Alcohol use: Yes     Alcohol/week: 2.0 standard drinks of alcohol     Types: 1 Cans of beer, 1 Glasses of wine per week     Comment: social    Drug use: No     Social Determinants of Health     Financial Resource Strain: Low Risk  (2023)    [facility]

## 2025-08-18 ENCOUNTER — APPOINTMENT (OUTPATIENT)
Dept: CARDIOLOGY | Facility: CLINIC | Age: 75
End: 2025-08-18
Payer: MEDICARE

## 2025-08-18 VITALS
HEIGHT: 73 IN | HEART RATE: 60 BPM | BODY MASS INDEX: 37.14 KG/M2 | SYSTOLIC BLOOD PRESSURE: 126 MMHG | DIASTOLIC BLOOD PRESSURE: 74 MMHG | WEIGHT: 280.2 LBS

## 2025-08-18 DIAGNOSIS — I25.118 CORONARY ARTERY DISEASE OF NATIVE ARTERY OF NATIVE HEART WITH STABLE ANGINA PECTORIS: ICD-10-CM

## 2025-08-18 DIAGNOSIS — I20.9 ANGINA, CLASS I: ICD-10-CM

## 2025-08-18 DIAGNOSIS — E78.2 MIXED HYPERLIPIDEMIA: ICD-10-CM

## 2025-08-18 DIAGNOSIS — I48.11 LONGSTANDING PERSISTENT ATRIAL FIBRILLATION (MULTI): ICD-10-CM

## 2025-08-18 DIAGNOSIS — I10 PRIMARY HYPERTENSION: ICD-10-CM

## 2025-08-18 RX ORDER — DOFETILIDE 0.12 MG/1
125 CAPSULE ORAL 2 TIMES DAILY
Qty: 180 CAPSULE | Refills: 1 | Status: SHIPPED | OUTPATIENT
Start: 2025-08-18 | End: 2026-02-14

## 2025-08-18 RX ORDER — METOPROLOL SUCCINATE 25 MG/1
25 TABLET, EXTENDED RELEASE ORAL EVERY OTHER DAY
Qty: 45 TABLET | Refills: 3 | Status: SHIPPED | OUTPATIENT
Start: 2025-08-18 | End: 2026-08-18

## 2025-08-18 RX ORDER — RANOLAZINE 1000 MG/1
1 TABLET, EXTENDED RELEASE ORAL EVERY 12 HOURS
Qty: 180 TABLET | Refills: 3 | Status: SHIPPED | OUTPATIENT
Start: 2025-08-18 | End: 2026-08-18

## 2025-08-18 RX ORDER — ROSUVASTATIN CALCIUM 10 MG/1
10 TABLET, COATED ORAL NIGHTLY
Qty: 90 TABLET | Refills: 3 | Status: SHIPPED | OUTPATIENT
Start: 2025-08-18 | End: 2026-08-18

## 2025-08-18 RX ORDER — RAMIPRIL 10 MG/1
10 CAPSULE ORAL DAILY
Qty: 90 CAPSULE | Refills: 3 | Status: SHIPPED | OUTPATIENT
Start: 2025-08-18 | End: 2026-08-18

## 2025-08-18 RX ORDER — DOFETILIDE 0.25 MG/1
CAPSULE ORAL
Qty: 180 CAPSULE | Refills: 1 | Status: SHIPPED | OUTPATIENT
Start: 2025-08-18

## 2025-08-18 RX ORDER — ASPIRIN 81 MG/1
162 TABLET ORAL DAILY
Qty: 180 TABLET | Refills: 3 | Status: SHIPPED | OUTPATIENT
Start: 2025-08-18 | End: 2026-08-18

## 2026-02-16 ENCOUNTER — APPOINTMENT (OUTPATIENT)
Dept: CARDIOLOGY | Facility: CLINIC | Age: 76
End: 2026-02-16
Payer: MEDICARE